# Patient Record
Sex: FEMALE | Race: WHITE | Employment: UNEMPLOYED | URBAN - METROPOLITAN AREA
[De-identification: names, ages, dates, MRNs, and addresses within clinical notes are randomized per-mention and may not be internally consistent; named-entity substitution may affect disease eponyms.]

---

## 2019-04-08 PROBLEM — G35 MULTIPLE SCLEROSIS (HCC): Status: ACTIVE | Noted: 2019-04-08

## 2019-04-09 ENCOUNTER — OFFICE VISIT (OUTPATIENT)
Dept: FAMILY MEDICINE CLINIC | Facility: CLINIC | Age: 44
End: 2019-04-09
Payer: COMMERCIAL

## 2019-04-09 ENCOUNTER — TELEPHONE (OUTPATIENT)
Dept: FAMILY MEDICINE CLINIC | Facility: CLINIC | Age: 44
End: 2019-04-09

## 2019-04-09 ENCOUNTER — LAB ENCOUNTER (OUTPATIENT)
Dept: LAB | Facility: REFERENCE LAB | Age: 44
End: 2019-04-09
Attending: FAMILY MEDICINE
Payer: COMMERCIAL

## 2019-04-09 VITALS
RESPIRATION RATE: 17 BRPM | HEART RATE: 90 BPM | DIASTOLIC BLOOD PRESSURE: 62 MMHG | WEIGHT: 156 LBS | OXYGEN SATURATION: 98 % | BODY MASS INDEX: 23.37 KG/M2 | SYSTOLIC BLOOD PRESSURE: 96 MMHG | HEIGHT: 68.5 IN

## 2019-04-09 DIAGNOSIS — M21.371 FOOT DROP, RIGHT FOOT: ICD-10-CM

## 2019-04-09 DIAGNOSIS — G35 MULTIPLE SCLEROSIS (HCC): ICD-10-CM

## 2019-04-09 DIAGNOSIS — G89.29 CHRONIC PAIN OF RIGHT KNEE: ICD-10-CM

## 2019-04-09 DIAGNOSIS — K59.09 OTHER CONSTIPATION: ICD-10-CM

## 2019-04-09 DIAGNOSIS — M25.561 CHRONIC PAIN OF RIGHT KNEE: ICD-10-CM

## 2019-04-09 DIAGNOSIS — M23.91 ACUTE INTERNAL DERANGEMENT OF RIGHT KNEE: ICD-10-CM

## 2019-04-09 DIAGNOSIS — G35 MULTIPLE SCLEROSIS (HCC): Primary | ICD-10-CM

## 2019-04-09 DIAGNOSIS — K21.9 GASTROESOPHAGEAL REFLUX DISEASE WITHOUT ESOPHAGITIS: ICD-10-CM

## 2019-04-09 DIAGNOSIS — R53.1 WEAKNESS OF RIGHT SIDE OF BODY: ICD-10-CM

## 2019-04-09 DIAGNOSIS — L30.9 DERMATITIS: ICD-10-CM

## 2019-04-09 DIAGNOSIS — Z00.00 ROUTINE MEDICAL EXAM: ICD-10-CM

## 2019-04-09 DIAGNOSIS — L71.9 ROSACEA: ICD-10-CM

## 2019-04-09 DIAGNOSIS — R32: ICD-10-CM

## 2019-04-09 PROCEDURE — 86431 RHEUMATOID FACTOR QUANT: CPT | Performed by: FAMILY MEDICINE

## 2019-04-09 PROCEDURE — 86038 ANTINUCLEAR ANTIBODIES: CPT | Performed by: FAMILY MEDICINE

## 2019-04-09 PROCEDURE — 80061 LIPID PANEL: CPT | Performed by: FAMILY MEDICINE

## 2019-04-09 PROCEDURE — 85652 RBC SED RATE AUTOMATED: CPT | Performed by: FAMILY MEDICINE

## 2019-04-09 PROCEDURE — 80050 GENERAL HEALTH PANEL: CPT | Performed by: FAMILY MEDICINE

## 2019-04-09 PROCEDURE — 36415 COLL VENOUS BLD VENIPUNCTURE: CPT | Performed by: FAMILY MEDICINE

## 2019-04-09 PROCEDURE — 99205 OFFICE O/P NEW HI 60 MIN: CPT | Performed by: FAMILY MEDICINE

## 2019-04-09 RX ORDER — ELECTROLYTES/DEXTROSE
1 SOLUTION, ORAL ORAL DAILY
Qty: 90 TABLET | Refills: 3 | Status: SHIPPED | OUTPATIENT
Start: 2019-04-09 | End: 2019-04-19

## 2019-04-09 RX ORDER — DALFAMPRIDINE 10 MG/1
1 TABLET, FILM COATED, EXTENDED RELEASE ORAL 2 TIMES DAILY
Qty: 180 TABLET | Refills: 0 | Status: SHIPPED | OUTPATIENT
Start: 2019-04-09 | End: 2019-07-08

## 2019-04-09 RX ORDER — RANITIDINE 150 MG/1
150 TABLET ORAL 2 TIMES DAILY
Qty: 180 TABLET | Refills: 0 | Status: SHIPPED | OUTPATIENT
Start: 2019-04-09 | End: 2019-10-01 | Stop reason: ALTCHOICE

## 2019-04-09 RX ORDER — MELOXICAM 7.5 MG/1
7.5 TABLET ORAL 2 TIMES DAILY WITH MEALS
Qty: 180 TABLET | Refills: 0 | Status: SHIPPED | OUTPATIENT
Start: 2019-04-09 | End: 2019-04-19 | Stop reason: ALTCHOICE

## 2019-04-09 RX ORDER — CLONAZEPAM 0.5 MG/1
0.5 TABLET ORAL DAILY
Refills: 2 | COMMUNITY
Start: 2019-02-10 | End: 2019-04-10

## 2019-04-09 RX ORDER — RANITIDINE 150 MG/1
150 TABLET ORAL DAILY
Refills: 0 | COMMUNITY
Start: 2019-02-05 | End: 2019-04-19

## 2019-04-09 RX ORDER — TOLTERODINE 4 MG/1
4 CAPSULE, EXTENDED RELEASE ORAL DAILY
Qty: 90 CAPSULE | Refills: 0 | Status: SHIPPED | OUTPATIENT
Start: 2019-04-09 | End: 2019-07-01

## 2019-04-09 NOTE — PROGRESS NOTES
HPI:   Patient presents with:  Establish Care  Multiple Sclerosis  Gastro-esophageal Reflux  Musculoskeletal Problem  Urinary  Constipation  Arthritis  Weakness  Derm Problem      Lazaro Allison is a 37year old female.     She primarily presents for: of food or animal allergies    GERD:  Heartburn, dyspepsia  Abd Pain present:epigastric  Belching present: yes  Medication(s): see below, ranitidine 150 mg once a day which is partially helpful  Compliant with GERD diet: yes  Denies other chest pain or rec Disp: 180 tablet Rfl: 0      Past Medical History:   Diagnosis Date   • Esophageal reflux    • [facility]) ([facility]          History reviewed. No pertinent surgical history.   No Known Allergies   Social History:  Social History    Tobacco Use auscultation B, no wheezing, no rhonchi or rales B   CARDIO: RRR without murmur, NL S1 S2, no S3 S4  EXT: no CCE; Brachial and PT/DP pulses wnl B  GI: NABS, soft, nondistended, no masses, no HSM, no tenderness, no guarding or rebound  NEURO: A&O x 3, right Dispense: 180 tablet; Refill: 0  - ASHLEE,DIRECT,REFLEX TITER + SPECIFIC ANTIBODIES; Future  - CBC WITH DIFFERENTIAL WITH PLATELET; Future  - COMP METABOLIC PANEL (14); Future  - RHEUMATOID ARTHRITIS FACTOR; Future  - SED LIGIA RAMACHANDRAN (AUTOMATED);  Future 4 mg once a day, follow-up in 2-3 months or sooner as needed  - Tolterodine Tartrate ER 4 MG Oral Capsule SR 24 Hr; Take 1 capsule (4 mg total) by mouth daily. Dispense: 90 capsule; Refill: 0    7.  Chronic pain of right knee, worsening  Discussed etiology MG Oral Tab 180 tablet 0     Sig: Take 1 tablet (150 mg total) by mouth 2 (two) times daily. • Meloxicam 7.5 MG Oral Tab 180 tablet 0     Sig: Take 1 tablet (7.5 mg total) by mouth 2 (two) times daily with meals.  And with ranitidine 150 mg   • Multiple V

## 2019-04-09 NOTE — TELEPHONE ENCOUNTER
Spoke to pharmacy, they state pt's insurance will not cover 7.5mg BID; asking if ok to change script to Meloxicam 15mg Take 1/2 tablet twice daily. Pended as suggested by pharmacy.

## 2019-04-10 DIAGNOSIS — G35 MS (MULTIPLE SCLEROSIS) (HCC): Primary | ICD-10-CM

## 2019-04-10 RX ORDER — CLONAZEPAM 0.5 MG/1
0.5 TABLET ORAL DAILY
Qty: 15 TABLET | Refills: 0 | Status: SHIPPED
Start: 2019-04-10 | End: 2019-05-28

## 2019-04-10 RX ORDER — MELOXICAM 15 MG/1
7.5 TABLET ORAL 2 TIMES DAILY
Qty: 90 TABLET | Refills: 0 | Status: SHIPPED | OUTPATIENT
Start: 2019-04-10 | End: 2019-05-28

## 2019-04-10 RX ORDER — CLONAZEPAM 0.5 MG/1
0.5 TABLET ORAL DAILY
Qty: 15 TABLET | Refills: 0 | Status: SHIPPED | OUTPATIENT
Start: 2019-04-10 | End: 2019-04-10

## 2019-04-10 NOTE — TELEPHONE ENCOUNTER
Called pt, advised #15 Clonazepam prescribed only, refills to be prescribed by Neurologist. Pt states she is unable to get into see Dr. Grisel Casas, referred pt to Dr. Javier Nino. Provided pt information to scheduled. Patient verbalizes understanding.     Advise

## 2019-04-10 NOTE — TELEPHONE ENCOUNTER
Advise pt that I will be happy to fill the clonazepam temporarily-she will need to see Neuro for further RFs as appropriate for txt of her MS, we did discuss this at her OV as well

## 2019-04-11 ENCOUNTER — MED REC SCAN ONLY (OUTPATIENT)
Dept: FAMILY MEDICINE CLINIC | Facility: CLINIC | Age: 44
End: 2019-04-11

## 2019-04-11 NOTE — TELEPHONE ENCOUNTER
Spoke to pharmacist at [facility]. No notes in their system stating there is an issue with the patient's medications.

## 2019-04-19 ENCOUNTER — HOSPITAL ENCOUNTER (OUTPATIENT)
Dept: GENERAL RADIOLOGY | Facility: HOSPITAL | Age: 44
Discharge: HOME | End: 2019-04-19
Attending: ORTHOPAEDIC SURGERY
Payer: COMMERCIAL

## 2019-04-19 DIAGNOSIS — Z47.89 ORTHOPEDIC AFTERCARE: ICD-10-CM

## 2019-05-28 ENCOUNTER — TELEPHONE (OUTPATIENT)
Dept: NEUROLOGY | Facility: CLINIC | Age: 44
End: 2019-05-28

## 2019-05-28 ENCOUNTER — OFFICE VISIT (OUTPATIENT)
Dept: NEUROLOGY | Facility: CLINIC | Age: 44
End: 2019-05-28
Payer: COMMERCIAL

## 2019-05-28 VITALS
DIASTOLIC BLOOD PRESSURE: 64 MMHG | HEIGHT: 69 IN | SYSTOLIC BLOOD PRESSURE: 110 MMHG | HEART RATE: 76 BPM | WEIGHT: 165 LBS | BODY MASS INDEX: 24.44 KG/M2 | RESPIRATION RATE: 16 BRPM

## 2019-05-28 DIAGNOSIS — G35 MS (MULTIPLE SCLEROSIS) (HCC): ICD-10-CM

## 2019-05-28 PROCEDURE — 99204 OFFICE O/P NEW MOD 45 MIN: CPT | Performed by: OTHER

## 2019-05-28 RX ORDER — PAROXETINE 10 MG/1
10 TABLET, FILM COATED ORAL EVERY MORNING
Qty: 90 TABLET | Refills: 3 | Status: SHIPPED | OUTPATIENT
Start: 2019-05-28 | End: 2019-05-28

## 2019-05-28 RX ORDER — DALFAMPRIDINE 10 MG/1
10 TABLET, FILM COATED, EXTENDED RELEASE ORAL 2 TIMES DAILY
Qty: 60 TABLET | Refills: 5 | Status: SHIPPED | OUTPATIENT
Start: 2019-05-28 | End: 2019-06-14

## 2019-05-28 RX ORDER — CLONAZEPAM 0.5 MG/1
0.5 TABLET ORAL DAILY
Qty: 15 TABLET | Refills: 0 | Status: SHIPPED | OUTPATIENT
Start: 2019-05-28 | End: 2019-05-29

## 2019-05-28 NOTE — PROGRESS NOTES
Neurology Initial Visit     Referred By: Dr. Vasquez ref. provider found    Chief Complaint: Patient presents with:  Multiple Sclerosis: New patient here for hx of MS for past 10 years.  Patient reports symptoms include right sided weakness/heavyness, swallowi excessive amount of tests ordered. She has been using Advil. She has been using clonazepam.  It seems that her insurance are now in the beginning of 2019 and she had to switch to a new insurance and that is why she ended up in our clinic at that time.   Levar Restrepo review was done and was negative      Physical Exam:   05/28/19  1048   BP: 110/64   Pulse: 76   Resp: 16   Weight: 165 lb   Height: 69\"       General: No apparent distress, well nourished, well groomed.   Head- Normocephalic, atraumatic  Eyes- No redness Value Date    HGB 13.4 04/09/2019    HCT 38.5 04/09/2019    MCV 94.8 04/09/2019    WBC 4.9 04/09/2019    .0 04/09/2019      Lab Results   Component Value Date    BUN 10 04/09/2019    CA 8.8 04/09/2019    ALT 26 04/09/2019    AST 15 04/09/2019    ALB

## 2019-05-28 NOTE — PROGRESS NOTES
Call placed to [facility] office( prior neuro md). All records requested to be faxed. Authorization of records signed by patient. Spoke with Elinor at [facility] office who stated she will put the request in and fax the information.

## 2019-05-28 NOTE — TELEPHONE ENCOUNTER
Pt called stated she was prescribed peroxetine 10, but needs peroxetine 20mg - pended accordingly.     Pt left two mailings, one to get out of jury duty and one to continue getting disability through life insurance - would like them to be completed as soon

## 2019-05-29 ENCOUNTER — TELEPHONE (OUTPATIENT)
Dept: NEUROLOGY | Facility: CLINIC | Age: 44
End: 2019-05-29

## 2019-05-29 ENCOUNTER — MED REC SCAN ONLY (OUTPATIENT)
Dept: NEUROLOGY | Facility: CLINIC | Age: 44
End: 2019-05-29

## 2019-05-29 ENCOUNTER — PATIENT MESSAGE (OUTPATIENT)
Dept: NEUROLOGY | Facility: CLINIC | Age: 44
End: 2019-05-29

## 2019-05-29 DIAGNOSIS — G35 MS (MULTIPLE SCLEROSIS) (HCC): ICD-10-CM

## 2019-05-29 RX ORDER — PAROXETINE HYDROCHLORIDE 20 MG/1
20 TABLET, FILM COATED ORAL EVERY MORNING
Qty: 90 TABLET | Refills: 3 | Status: SHIPPED | OUTPATIENT
Start: 2019-05-29 | End: 2020-05-14

## 2019-05-29 RX ORDER — CLONAZEPAM 0.5 MG/1
0.5 TABLET ORAL DAILY
Qty: 30 TABLET | Refills: 0 | COMMUNITY
Start: 2019-05-29 | End: 2019-06-25

## 2019-05-29 NOTE — TELEPHONE ENCOUNTER
From: Lazaro Allison  To: Ana Tapia MD  Sent: 5/29/2019 4:14 PM CDT  Subject: Non-Urgent Medical Question    Thank you i also gave a letter from [employer] to the

## 2019-05-29 NOTE — TELEPHONE ENCOUNTER
S/w Razia Ames ([id number]) who states MD did not sign one page from Tysabri form. Dr. Sean Young signed formed and re-faxed to [id number].

## 2019-05-29 NOTE — TELEPHONE ENCOUNTER
Pt requesting note to excuse her from jury duty. Letter drafted/signed. Copy sent to scanning. Original placed in envelope addressed to [facility] in [place] as provided by pt. Pt informed letter has been sent via [address] encounter 5/29/19.      ASHWIN hdz

## 2019-05-29 NOTE — TELEPHONE ENCOUNTER
From: Ector Valderrama  To: Fiona Carrion MD  Sent: 5/29/2019 2:30 PM CDT  Subject: Prescription Question    I need 1 tablet daily of clonazepam i am used to taking 2 tabs daily half a tablet will not work [facility] didn't get ibuprofen request

## 2019-05-29 NOTE — PROGRESS NOTES
Verbal approval from Dr. Alessandra Carlos to call in 1 tab daily of clonazepam 0.5mg daily. Prescription called into [facility] at [employer] in [facility].

## 2019-05-30 ENCOUNTER — TELEPHONE (OUTPATIENT)
Dept: NEUROLOGY | Facility: CLINIC | Age: 44
End: 2019-05-30

## 2019-06-03 NOTE — PROGRESS NOTES
Life insurance forms have been filled out and placed on Dr. Yunior Castro desk for [employer]. Once signed, patient to be informed. Copies of forms to be made/sent to scanning.  Originals to be mailed to [employer] using addressed/stamped en

## 2019-06-04 NOTE — PROGRESS NOTES
Jeffery Talavera forms have been signed and sent to [address] [place] [address] at [facility], [place], [facility] using address provided. Pt informed via [employer]. Copy of forms sent to scanning.

## 2019-06-06 NOTE — TELEPHONE ENCOUNTER
Jax Frias states that PA is required for Rx. PA goes through [email or web address] once form printed can be faxed to [phone] with clinical notes.

## 2019-06-08 ENCOUNTER — MED REC SCAN ONLY (OUTPATIENT)
Dept: NEUROLOGY | Facility: CLINIC | Age: 44
End: 2019-06-08

## 2019-06-14 ENCOUNTER — PATIENT MESSAGE (OUTPATIENT)
Dept: NEUROLOGY | Facility: CLINIC | Age: 44
End: 2019-06-14

## 2019-06-14 NOTE — TELEPHONE ENCOUNTER
From: Jaylin Miles  To: Joie Velasco MD  Sent: 6/14/2019 11:45 AM CDT  Subject: Prescription Question    Can u plz send ampyra not as generic?  They will cover name brand for me thank you

## 2019-06-14 NOTE — TELEPHONE ENCOUNTER
Tysabri was denied by insurance. Patient needs to try and fail gilenya before they will approve medication.

## 2019-06-17 RX ORDER — DALFAMPRIDINE 10 MG/1
10 TABLET, FILM COATED, EXTENDED RELEASE ORAL 2 TIMES DAILY
Qty: 60 TABLET | Refills: 5 | Status: SHIPPED | OUTPATIENT
Start: 2019-06-17 | End: 2019-07-09

## 2019-06-17 RX ORDER — DALFAMPRIDINE 10 MG/1
1 TABLET, FILM COATED, EXTENDED RELEASE ORAL 2 TIMES DAILY
Qty: 60 TABLET | Refills: 5 | Status: SHIPPED | OUTPATIENT
Start: 2019-06-17 | End: 2019-06-17

## 2019-06-17 RX ORDER — DALFAMPRIDINE 10 MG/1
1 TABLET, FILM COATED, EXTENDED RELEASE ORAL 2 TIMES DAILY
Qty: 60 TABLET | Refills: 5 | Status: SHIPPED | OUTPATIENT
Start: 2019-06-17 | End: 2019-07-09

## 2019-06-17 NOTE — TELEPHONE ENCOUNTER
Spoke to patient and notified her of below. She states that she is getting assistance from [employer] who is covering the drug. She states that they have been trying get to a hold of our office. Explained that I would call them to see what is needed.      I kevin

## 2019-06-17 NOTE — TELEPHONE ENCOUNTER
Antoni Kebede 5 minutes ago (1:49 PM)        Ellen Walker @ [employer] returning [employer]. States can leave detail message at number given.   [employer] stating an appeal needs to be done and if denied again Pt will be screened for a free drug program.         Appeal

## 2019-06-17 NOTE — TELEPHONE ENCOUNTER
Renee Paul @ [employer] [facility]. States can leave detail message at number given.   Renee Paul stating an appeal needs to be done and if denied again Pt will be screened for a free drug program.

## 2019-06-22 ENCOUNTER — PATIENT MESSAGE (OUTPATIENT)
Dept: NEUROLOGY | Facility: CLINIC | Age: 44
End: 2019-06-22

## 2019-06-22 DIAGNOSIS — G35 MULTIPLE SCLEROSIS (HCC): Primary | ICD-10-CM

## 2019-06-24 NOTE — TELEPHONE ENCOUNTER
From: Chapito Strong  To: Kat Brewer MD  Sent: 6/22/2019 2:59 PM CDT  Subject: Non-Urgent Medical Question    Any word on tysabri or ampyra? Can u [facility] script for sarahi virus plz? Nothing seems to be working out here. I feel awful.

## 2019-06-24 NOTE — PROGRESS NOTES
Replied to patient via [employer] informing her that Tysabri approval  Is pending, also the Asif Bravo was sent to her pharamacy. Also, BECCA virus lab order placed.

## 2019-06-25 DIAGNOSIS — G35 MS (MULTIPLE SCLEROSIS) (HCC): ICD-10-CM

## 2019-06-25 RX ORDER — CLONAZEPAM 0.5 MG/1
TABLET ORAL
Qty: 30 TABLET | Refills: 0 | OUTPATIENT
Start: 2019-06-28 | End: 2019-07-25

## 2019-06-25 NOTE — TELEPHONE ENCOUNTER
refill request for clonzepam 0.5 mg, take 1 tab daily, #30, no refills    LOV: 5/28/19  NOV: 8/23/19  Last refilled on 5/29/19

## 2019-06-30 ENCOUNTER — PATIENT MESSAGE (OUTPATIENT)
Dept: FAMILY MEDICINE CLINIC | Facility: CLINIC | Age: 44
End: 2019-06-30

## 2019-06-30 DIAGNOSIS — R32: ICD-10-CM

## 2019-07-01 ENCOUNTER — TELEPHONE (OUTPATIENT)
Dept: HEMATOLOGY/ONCOLOGY | Facility: HOSPITAL | Age: 44
End: 2019-07-01

## 2019-07-01 ENCOUNTER — TELEPHONE (OUTPATIENT)
Dept: NEUROLOGY | Facility: CLINIC | Age: 44
End: 2019-07-01

## 2019-07-01 RX ORDER — TOLTERODINE 4 MG/1
4 CAPSULE, EXTENDED RELEASE ORAL DAILY
Qty: 90 CAPSULE | Refills: 0 | Status: SHIPPED | OUTPATIENT
Start: 2019-07-01 | End: 2019-09-23

## 2019-07-01 NOTE — TELEPHONE ENCOUNTER
From: Linda Ventura  To: Lori Lemons MD  Sent: 6/30/2019 8:59 AM CDT  Subject: Prescription Question    Hi i need refill on tolterodine bladder pill I have an appointment next tuesday thank you

## 2019-07-01 NOTE — TELEPHONE ENCOUNTER
Spoke with Olivia at Dr. Alyssa Cowden office. Pt had BECCA virus previously done at another facility and is OK to start Tysabri tomorrow.

## 2019-07-01 NOTE — TELEPHONE ENCOUNTER
Received a call from [facility]. Patient will be getting tysabri infusion tomorrow. BECCA Virus was ordered and patient will be [facility] home. Last stratify JCV index done on 2/18/19 was negative with index of 0.14.

## 2019-07-01 NOTE — TELEPHONE ENCOUNTER
A refill request was received for:  Requested Prescriptions     Pending Prescriptions Disp Refills   • Tolterodine Tartrate ER 4 MG Oral Capsule SR 24 Hr 90 capsule 0     Sig: Take 1 capsule (4 mg total) by mouth daily.      Last refill date:  4/9/2019  Jillian Aparicio

## 2019-07-02 ENCOUNTER — OFFICE VISIT (OUTPATIENT)
Dept: HEMATOLOGY/ONCOLOGY | Facility: HOSPITAL | Age: 44
End: 2019-07-02
Attending: Other
Payer: COMMERCIAL

## 2019-07-02 VITALS
HEART RATE: 105 BPM | RESPIRATION RATE: 16 BRPM | TEMPERATURE: 99 F | BODY MASS INDEX: 24 KG/M2 | WEIGHT: 159.38 LBS | DIASTOLIC BLOOD PRESSURE: 75 MMHG | OXYGEN SATURATION: 98 % | SYSTOLIC BLOOD PRESSURE: 121 MMHG

## 2019-07-02 DIAGNOSIS — G35 MULTIPLE SCLEROSIS (HCC): Primary | ICD-10-CM

## 2019-07-02 PROCEDURE — 96365 THER/PROPH/DIAG IV INF INIT: CPT

## 2019-07-02 NOTE — PROGRESS NOTES
Patient arrived for monthly Tysabri. Touch program questionnaire completed. Patient denies any new or worsening medical problems over the past month.  Denies any changes in thinking, eyesight, balance, strength or any other problems that have persisted ov mobility will be better.

## 2019-07-09 ENCOUNTER — APPOINTMENT (OUTPATIENT)
Dept: LAB | Age: 44
End: 2019-07-09
Attending: FAMILY MEDICINE
Payer: COMMERCIAL

## 2019-07-09 ENCOUNTER — OFFICE VISIT (OUTPATIENT)
Dept: FAMILY MEDICINE CLINIC | Facility: CLINIC | Age: 44
End: 2019-07-09
Payer: COMMERCIAL

## 2019-07-09 VITALS
WEIGHT: 160 LBS | SYSTOLIC BLOOD PRESSURE: 100 MMHG | BODY MASS INDEX: 23.7 KG/M2 | HEIGHT: 69 IN | HEART RATE: 101 BPM | DIASTOLIC BLOOD PRESSURE: 78 MMHG

## 2019-07-09 DIAGNOSIS — E55.9 VITAMIN D DEFICIENCY: ICD-10-CM

## 2019-07-09 DIAGNOSIS — R21 RASH AND NONSPECIFIC SKIN ERUPTION: ICD-10-CM

## 2019-07-09 DIAGNOSIS — Z79.899 LONG-TERM USE OF HIGH-RISK MEDICATION: ICD-10-CM

## 2019-07-09 DIAGNOSIS — N32.81 OVERACTIVE BLADDER: ICD-10-CM

## 2019-07-09 DIAGNOSIS — B37.3 YEAST VAGINITIS: ICD-10-CM

## 2019-07-09 DIAGNOSIS — G35 MULTIPLE SCLEROSIS (HCC): ICD-10-CM

## 2019-07-09 DIAGNOSIS — M21.961 ACQUIRED DEFORMITY OF RIGHT ANKLE AND FOOT: ICD-10-CM

## 2019-07-09 DIAGNOSIS — M62.471 CONTRACTURE OF MUSCLE OF RIGHT FOOT: Primary | ICD-10-CM

## 2019-07-09 DIAGNOSIS — R73.9 HYPERGLYCEMIA: ICD-10-CM

## 2019-07-09 DIAGNOSIS — M25.50 ARTHRALGIA, UNSPECIFIED JOINT: ICD-10-CM

## 2019-07-09 DIAGNOSIS — L71.9 ROSACEA: ICD-10-CM

## 2019-07-09 PROBLEM — B35.1 ONYCHOMYCOSIS: Status: ACTIVE | Noted: 2019-07-09

## 2019-07-09 PROBLEM — N94.6 DYSMENORRHEA: Status: ACTIVE | Noted: 2019-07-09

## 2019-07-09 LAB
ALBUMIN SERPL-MCNC: 4.1 G/DL (ref 3.4–5)
ALBUMIN/GLOB SERPL: 1.2 {RATIO} (ref 1–2)
ALP LIVER SERPL-CCNC: 83 U/L (ref 37–98)
ALT SERPL-CCNC: 24 U/L (ref 13–56)
ANION GAP SERPL CALC-SCNC: 8 MMOL/L (ref 0–18)
AST SERPL-CCNC: 15 U/L (ref 15–37)
BILIRUB SERPL-MCNC: 0.5 MG/DL (ref 0.1–2)
BUN BLD-MCNC: 9 MG/DL (ref 7–18)
BUN/CREAT SERPL: 11.3 (ref 10–20)
CALCIUM BLD-MCNC: 9 MG/DL (ref 8.5–10.1)
CHLORIDE SERPL-SCNC: 107 MMOL/L (ref 98–112)
CO2 SERPL-SCNC: 26 MMOL/L (ref 21–32)
CREAT BLD-MCNC: 0.8 MG/DL (ref 0.55–1.02)
EST. AVERAGE GLUCOSE BLD GHB EST-MCNC: 85 MG/DL (ref 68–126)
GLOBULIN PLAS-MCNC: 3.4 G/DL (ref 2.8–4.4)
GLUCOSE BLD-MCNC: 92 MG/DL (ref 70–99)
HBA1C MFR BLD HPLC: 4.6 % (ref ?–5.7)
M PROTEIN MFR SERPL ELPH: 7.5 G/DL (ref 6.4–8.2)
OSMOLALITY SERPL CALC.SUM OF ELEC: 290 MOSM/KG (ref 275–295)
PATIENT FASTING: NO
POTASSIUM SERPL-SCNC: 4 MMOL/L (ref 3.5–5.1)
SODIUM SERPL-SCNC: 141 MMOL/L (ref 136–145)

## 2019-07-09 PROCEDURE — 36415 COLL VENOUS BLD VENIPUNCTURE: CPT

## 2019-07-09 PROCEDURE — 99214 OFFICE O/P EST MOD 30 MIN: CPT | Performed by: FAMILY MEDICINE

## 2019-07-09 PROCEDURE — 80053 COMPREHEN METABOLIC PANEL: CPT | Performed by: FAMILY MEDICINE

## 2019-07-09 PROCEDURE — 83036 HEMOGLOBIN GLYCOSYLATED A1C: CPT | Performed by: FAMILY MEDICINE

## 2019-07-09 PROCEDURE — 83036 HEMOGLOBIN GLYCOSYLATED A1C: CPT

## 2019-07-09 PROCEDURE — 82306 VITAMIN D 25 HYDROXY: CPT | Performed by: FAMILY MEDICINE

## 2019-07-09 RX ORDER — DICLOFENAC SODIUM 75 MG/1
75 TABLET, DELAYED RELEASE ORAL 2 TIMES DAILY
Qty: 60 TABLET | Refills: 2 | Status: SHIPPED | OUTPATIENT
Start: 2019-07-09 | End: 2019-10-03

## 2019-07-09 RX ORDER — METRONIDAZOLE 10 MG/G
1 GEL TOPICAL 2 TIMES DAILY
Qty: 60 G | Refills: 1 | Status: SHIPPED | OUTPATIENT
Start: 2019-07-09 | End: 2019-08-20 | Stop reason: ALTCHOICE

## 2019-07-09 RX ORDER — FLUCONAZOLE 150 MG/1
150 TABLET ORAL ONCE
Qty: 2 TABLET | Refills: 2 | Status: SHIPPED | OUTPATIENT
Start: 2019-07-09 | End: 2019-07-09

## 2019-07-09 NOTE — PROGRESS NOTES
HPI:   Patient presents with:  Multiple Sclerosis: R toe, unable to straighten  Lab: Vitamin D check, check kidneys for increased aspirin intake (3 x qd)  Derm Problem: upper bilat legs, patient feels they are flea bites.  Also redness on face  Yeast Infect 0.5 MG Oral Tab TAKE 1 TABLET BY MOUTH EVERY DAY Disp: 30 tablet Rfl: 0   natalizumab 300 MG/15ML Intravenous Conc Inject 15 mL (300 mg total) into the vein every 30 (thirty) days.  Dx: Multiple Sclerosis (G35) Disp: 15 mL Rfl: 3   PARoxetine HCl 20 MG Oral encounter: 160 lb.      GENERAL: well nourished, female  in no apparent distress  SKIN: skin color wnl, B L lower legs: few macular papular erythematous lesions, scattered  HEENT: atraumatic, normocephalic, nose clear; throat clear; dentition good   EYES: P (two) times daily. PRN, take w food  Dispense: 60 tablet; Refill: 2    3. 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  PRN, take w food  Dis Placed This Encounter      CMP      HgbA1c (Glycohemoglobin)      Meds & Refills for this Visit:  Requested Prescriptions     Signed Prescriptions Disp Refills   • Diclofenac Sodium 75 MG Oral Tab EC 60 tablet 2     Sig: Take 1 tablet (75 mg total) by catherine

## 2019-07-11 ENCOUNTER — TELEPHONE (OUTPATIENT)
Dept: FAMILY MEDICINE CLINIC | Facility: CLINIC | Age: 44
End: 2019-07-11

## 2019-07-11 DIAGNOSIS — G35 MULTIPLE SCLEROSIS (HCC): Primary | ICD-10-CM

## 2019-07-11 DIAGNOSIS — E55.9 VITAMIN D DEFICIENCY: ICD-10-CM

## 2019-07-11 NOTE — TELEPHONE ENCOUNTER
Printed last office note, demographics, med list, and referral to [place]. Placed at  for faxing.

## 2019-07-11 NOTE — TELEPHONE ENCOUNTER
[address] received referral from our office > in addition they need home health order signed by physician,the  latest office visit notes plus med list and  pt demopravernoncs.  fax [phone]

## 2019-07-24 ENCOUNTER — PATIENT MESSAGE (OUTPATIENT)
Dept: NEUROLOGY | Facility: CLINIC | Age: 44
End: 2019-07-24

## 2019-07-24 DIAGNOSIS — G35 MS (MULTIPLE SCLEROSIS) (HCC): ICD-10-CM

## 2019-07-25 DIAGNOSIS — G35 MS (MULTIPLE SCLEROSIS) (HCC): ICD-10-CM

## 2019-07-25 RX ORDER — CLONAZEPAM 0.5 MG/1
TABLET ORAL
Qty: 30 TABLET | Refills: 0 | OUTPATIENT
Start: 2019-07-25

## 2019-07-25 RX ORDER — CLONAZEPAM 0.5 MG/1
0.5 TABLET ORAL
Qty: 30 TABLET | Refills: 0 | Status: SHIPPED | OUTPATIENT
Start: 2019-07-25 | End: 2019-08-23

## 2019-07-25 NOTE — PROGRESS NOTES
Refill request for clonazepam 0.5 mg, take 1 tab daily, #30, no refills    LOV: 5/28/19  NOV: 8/28/19  [facility]

## 2019-07-25 NOTE — TELEPHONE ENCOUNTER
From: Kassi Krueger  To: Jomar Nielson MD  Sent: 7/24/2019 5:57 PM CDT  Subject: Prescription Question    Hi i need 30 day clonazepam not 15 please.

## 2019-07-30 ENCOUNTER — OFFICE VISIT (OUTPATIENT)
Dept: HEMATOLOGY/ONCOLOGY | Facility: HOSPITAL | Age: 44
End: 2019-07-30
Attending: Other
Payer: COMMERCIAL

## 2019-07-30 VITALS
HEART RATE: 88 BPM | RESPIRATION RATE: 16 BRPM | TEMPERATURE: 98 F | BODY MASS INDEX: 24 KG/M2 | WEIGHT: 159.63 LBS | SYSTOLIC BLOOD PRESSURE: 111 MMHG | OXYGEN SATURATION: 100 % | DIASTOLIC BLOOD PRESSURE: 68 MMHG

## 2019-07-30 DIAGNOSIS — G35 MULTIPLE SCLEROSIS (HCC): Primary | ICD-10-CM

## 2019-07-30 PROCEDURE — 96365 THER/PROPH/DIAG IV INF INIT: CPT

## 2019-07-30 NOTE — PROGRESS NOTES
Pt to infusion area for Tysabri. Ambulating with use of cane and brace on right knee. She states she does not have much movement in her right leg. She is happy with the Tysabri and states she has been able to move around much better since restarting it.  Ty

## 2019-08-16 ENCOUNTER — TELEPHONE (OUTPATIENT)
Dept: FAMILY MEDICINE CLINIC | Facility: CLINIC | Age: 44
End: 2019-08-16

## 2019-08-16 NOTE — TELEPHONE ENCOUNTER
Rcvd D/C orders from [facility]. Report placed on provider's desk for review and signature. Return fax to [phone].

## 2019-08-20 ENCOUNTER — OFFICE VISIT (OUTPATIENT)
Dept: FAMILY MEDICINE CLINIC | Facility: CLINIC | Age: 44
End: 2019-08-20
Payer: COMMERCIAL

## 2019-08-20 VITALS
WEIGHT: 161 LBS | HEIGHT: 68.2 IN | BODY MASS INDEX: 24.4 KG/M2 | DIASTOLIC BLOOD PRESSURE: 78 MMHG | RESPIRATION RATE: 17 BRPM | HEART RATE: 73 BPM | SYSTOLIC BLOOD PRESSURE: 110 MMHG

## 2019-08-20 DIAGNOSIS — Z12.31 SCREENING MAMMOGRAM, ENCOUNTER FOR: ICD-10-CM

## 2019-08-20 DIAGNOSIS — Z01.419 ENCOUNTER FOR WELL WOMAN EXAM WITH ROUTINE GYNECOLOGICAL EXAM: ICD-10-CM

## 2019-08-20 DIAGNOSIS — Z00.00 ROUTINE MEDICAL EXAM: Primary | ICD-10-CM

## 2019-08-20 DIAGNOSIS — Z12.4 SCREENING FOR MALIGNANT NEOPLASM OF CERVIX: ICD-10-CM

## 2019-08-20 PROCEDURE — 99396 PREV VISIT EST AGE 40-64: CPT | Performed by: FAMILY MEDICINE

## 2019-08-20 PROCEDURE — 87624 HPV HI-RISK TYP POOLED RSLT: CPT | Performed by: FAMILY MEDICINE

## 2019-08-20 RX ORDER — DALFAMPRIDINE 10 MG/1
TABLET, FILM COATED, EXTENDED RELEASE ORAL 2 TIMES DAILY
COMMUNITY
Start: 2019-08-19 | End: 2020-01-21

## 2019-08-20 NOTE — H&P
HPI:   Patient presents with:  Physical  Gyn Exam      Maia Hammonds is a 40year old female who presents for a complete physical and pap/gyne exam.     Patient has new complaints of:  · None    She denies CP, freq HAs,SOB, Dizziness, Palpitations, Weight anxiety disorder     Dysthymia     Dyspnea     Dysmenorrhea     Acquired scoliosis     Acquired deformity of ankle and foot     Acquired claw toe    Past Medical History:   Diagnosis Date   • Esophageal reflux    • MS (multiple sclerosis) ([facility]       Histo new mood concerns    EXAM:   /78 (BP Location: Right arm)   Pulse 73   Resp 17   Ht 68.2\"   Wt 161 lb   LMP 08/06/2019   BMI 24.34 kg/m² , Estimated body mass index is 24.34 kg/m² as calculated from the following:    Height as of this encounter: 68. 24.34 kg/m² as calculated from the following:    Height as of this encounter: 68.2\". Weight as of this encounter: 161 lb. .   Discussed importance of exercise and healthy well balanced diet.  Recommend low fat healthy DASH diet and aerobic exercise 30 mi

## 2019-08-21 LAB — HPV I/H RISK 1 DNA SPEC QL NAA+PROBE: NEGATIVE

## 2019-08-23 DIAGNOSIS — G35 MS (MULTIPLE SCLEROSIS) (HCC): ICD-10-CM

## 2019-08-23 NOTE — TELEPHONE ENCOUNTER
Refill request for clonazepam 0.5 mg, take 1 tab daily, #30, no refills    LOV: 5/28/19  NOV: 8/28/19  [place]

## 2019-08-26 ENCOUNTER — TELEPHONE (OUTPATIENT)
Dept: NEUROLOGY | Facility: CLINIC | Age: 44
End: 2019-08-26

## 2019-08-26 ENCOUNTER — PATIENT MESSAGE (OUTPATIENT)
Dept: NEUROLOGY | Facility: CLINIC | Age: 44
End: 2019-08-26

## 2019-08-26 DIAGNOSIS — G35 MS (MULTIPLE SCLEROSIS) (HCC): ICD-10-CM

## 2019-08-26 RX ORDER — CLONAZEPAM 0.5 MG/1
0.5 TABLET ORAL
Qty: 30 TABLET | Refills: 0 | Status: CANCELLED | OUTPATIENT
Start: 2019-08-26

## 2019-08-26 RX ORDER — CLONAZEPAM 0.5 MG/1
TABLET ORAL
Qty: 30 TABLET | Refills: 0 | Status: SHIPPED | OUTPATIENT
Start: 2019-08-26 | End: 2019-09-24

## 2019-08-26 NOTE — TELEPHONE ENCOUNTER
Infusion center calling to inform  that patient has cancelled her infusions via [employer] stating they are too expensive.

## 2019-08-27 ENCOUNTER — APPOINTMENT (OUTPATIENT)
Dept: HEMATOLOGY/ONCOLOGY | Facility: HOSPITAL | Age: 44
End: 2019-08-27
Attending: Other
Payer: COMMERCIAL

## 2019-08-27 NOTE — TELEPHONE ENCOUNTER
Can you reach out to her and find out if she wants a different disease modifying therapy since that is what would be recommended.

## 2019-08-27 NOTE — TELEPHONE ENCOUNTER
Called patient. Patient states that she is receiving Tysabri free of charge through the patient assistance program.    She has been getting her infusions but the hospital billed her $321.00.  She has been working with billing to resolve the issue but unt

## 2019-09-10 RX ORDER — FLUCONAZOLE 150 MG/1
TABLET ORAL
Qty: 2 TABLET | Refills: 0 | OUTPATIENT
Start: 2019-09-10

## 2019-09-10 RX ORDER — FLUCONAZOLE 150 MG/1
TABLET ORAL
Qty: 2 TABLET | Refills: 0 | Status: SHIPPED | OUTPATIENT
Start: 2019-09-10 | End: 2019-10-10

## 2019-09-10 NOTE — TELEPHONE ENCOUNTER
A refill request was received for:  Requested Prescriptions     Pending Prescriptions Disp Refills   • FLUCONAZOLE 150 MG Oral Tab [Pharmacy Med Name: FLUCONAZOLE 150MG TABLETS] 2 tablet 0     Sig: TAKE 1 TABLET(150 MG) BY MOUTH 1 TIME FOR 1 DOSE. MAY REPE

## 2019-09-11 ENCOUNTER — OFFICE VISIT (OUTPATIENT)
Dept: HEMATOLOGY/ONCOLOGY | Facility: HOSPITAL | Age: 44
End: 2019-09-11
Attending: Other
Payer: COMMERCIAL

## 2019-09-11 VITALS
HEART RATE: 97 BPM | RESPIRATION RATE: 16 BRPM | OXYGEN SATURATION: 100 % | TEMPERATURE: 98 F | DIASTOLIC BLOOD PRESSURE: 73 MMHG | SYSTOLIC BLOOD PRESSURE: 118 MMHG

## 2019-09-11 DIAGNOSIS — G35 MULTIPLE SCLEROSIS (HCC): Primary | ICD-10-CM

## 2019-09-11 PROCEDURE — 96365 THER/PROPH/DIAG IV INF INIT: CPT

## 2019-09-11 NOTE — PROGRESS NOTES
Pt to infusion area for Tysabri. Ambulating with use of cane and brace on right knee. She states she does not have much movement in her right leg. She is happy with the Tysabri and states she has been able to move around much better since restarting it.  On

## 2019-09-23 DIAGNOSIS — R32: ICD-10-CM

## 2019-09-24 DIAGNOSIS — G35 MS (MULTIPLE SCLEROSIS) (HCC): ICD-10-CM

## 2019-09-24 RX ORDER — TOLTERODINE 4 MG/1
CAPSULE, EXTENDED RELEASE ORAL
Qty: 90 CAPSULE | Refills: 0 | Status: SHIPPED | OUTPATIENT
Start: 2019-09-24 | End: 2019-12-20

## 2019-09-24 NOTE — TELEPHONE ENCOUNTER
A refill request was received for:  Requested Prescriptions     Pending Prescriptions Disp Refills   • TOLTERODINE TARTRATE ER 4 MG Oral Capsule SR 24 Hr [Pharmacy Med Name: TOLTERODINE TART ER 4MG CAPSULES] 90 capsule 0     Sig: TAKE 1 CAPSULE(4 MG) BY MO

## 2019-09-25 DIAGNOSIS — G35 MS (MULTIPLE SCLEROSIS) (HCC): ICD-10-CM

## 2019-09-25 RX ORDER — CLONAZEPAM 0.5 MG/1
0.5 TABLET ORAL
Qty: 30 TABLET | Refills: 0 | Status: SHIPPED | OUTPATIENT
Start: 2019-09-25 | End: 2019-10-23

## 2019-09-25 RX ORDER — CLONAZEPAM 0.5 MG/1
TABLET ORAL
Qty: 30 TABLET | Refills: 0 | OUTPATIENT
Start: 2019-09-25

## 2019-10-01 ENCOUNTER — TELEPHONE (OUTPATIENT)
Dept: FAMILY MEDICINE CLINIC | Facility: CLINIC | Age: 44
End: 2019-10-01

## 2019-10-01 NOTE — TELEPHONE ENCOUNTER
[facility] home health orders for pt from [facility]. Requests signature and date; return fax to [phone].

## 2019-10-03 DIAGNOSIS — M21.961 ACQUIRED DEFORMITY OF RIGHT ANKLE AND FOOT: ICD-10-CM

## 2019-10-03 DIAGNOSIS — M25.50 ARTHRALGIA, UNSPECIFIED JOINT: ICD-10-CM

## 2019-10-03 DIAGNOSIS — M62.471 CONTRACTURE OF MUSCLE OF RIGHT FOOT: ICD-10-CM

## 2019-10-03 DIAGNOSIS — G35 MULTIPLE SCLEROSIS (HCC): ICD-10-CM

## 2019-10-04 DIAGNOSIS — M21.961 ACQUIRED DEFORMITY OF RIGHT ANKLE AND FOOT: ICD-10-CM

## 2019-10-04 DIAGNOSIS — M25.50 ARTHRALGIA, UNSPECIFIED JOINT: ICD-10-CM

## 2019-10-04 DIAGNOSIS — G35 MULTIPLE SCLEROSIS (HCC): ICD-10-CM

## 2019-10-04 DIAGNOSIS — M62.471 CONTRACTURE OF MUSCLE OF RIGHT FOOT: ICD-10-CM

## 2019-10-04 RX ORDER — DICLOFENAC SODIUM 75 MG/1
75 TABLET, DELAYED RELEASE ORAL 2 TIMES DAILY
Qty: 60 TABLET | Refills: 2 | OUTPATIENT
Start: 2019-10-04

## 2019-10-04 RX ORDER — DICLOFENAC SODIUM 75 MG/1
TABLET, DELAYED RELEASE ORAL
Qty: 180 TABLET | Refills: 0 | Status: SHIPPED | OUTPATIENT
Start: 2019-10-04 | End: 2019-12-04

## 2019-10-04 NOTE — TELEPHONE ENCOUNTER
A refill request was received for:  Requested Prescriptions     Pending Prescriptions Disp Refills   • DICLOFENAC SODIUM 75 MG Oral Tab EC [Pharmacy Med Name: DICLOFENAC SODIUM 75MG DR TABLETS] 60 tablet 0     Sig: TAKE 1 TABLET(75 MG) BY MOUTH TWICE DAILY

## 2019-10-05 DIAGNOSIS — R32: ICD-10-CM

## 2019-10-07 RX ORDER — TOLTERODINE 4 MG/1
CAPSULE, EXTENDED RELEASE ORAL
Qty: 90 CAPSULE | Refills: 0 | OUTPATIENT
Start: 2019-10-07

## 2019-10-07 NOTE — TELEPHONE ENCOUNTER
rx refilled to [facility] on 9/24/2019 #90 by Dr. Mandi Eugene. Refill too soon - refused.

## 2019-10-09 ENCOUNTER — OFFICE VISIT (OUTPATIENT)
Dept: HEMATOLOGY/ONCOLOGY | Facility: HOSPITAL | Age: 44
End: 2019-10-09
Attending: Other
Payer: COMMERCIAL

## 2019-10-09 VITALS
TEMPERATURE: 99 F | RESPIRATION RATE: 16 BRPM | HEART RATE: 101 BPM | SYSTOLIC BLOOD PRESSURE: 115 MMHG | DIASTOLIC BLOOD PRESSURE: 77 MMHG | OXYGEN SATURATION: 100 %

## 2019-10-09 DIAGNOSIS — G35 MULTIPLE SCLEROSIS (HCC): Primary | ICD-10-CM

## 2019-10-09 PROCEDURE — 96365 THER/PROPH/DIAG IV INF INIT: CPT

## 2019-10-09 NOTE — PROGRESS NOTES
Pt to infusion for monthly Tysabri for MS. Arrived ambulating with use of cane. Brace present right leg. . Denies any recent changes in her condition. States she had accidentally bent her toe about a week ago getting out of bed.  States she could not feel pa

## 2019-10-10 ENCOUNTER — OFFICE VISIT (OUTPATIENT)
Dept: NEUROLOGY | Facility: CLINIC | Age: 44
End: 2019-10-10
Payer: COMMERCIAL

## 2019-10-10 VITALS — WEIGHT: 160 LBS | BODY MASS INDEX: 24.25 KG/M2 | HEIGHT: 68 IN

## 2019-10-10 DIAGNOSIS — S99.921A INJURY OF TOE ON RIGHT FOOT, INITIAL ENCOUNTER: ICD-10-CM

## 2019-10-10 DIAGNOSIS — G35 MS (MULTIPLE SCLEROSIS) (HCC): Primary | ICD-10-CM

## 2019-10-10 PROCEDURE — 99214 OFFICE O/P EST MOD 30 MIN: CPT | Performed by: OTHER

## 2019-10-10 RX ORDER — ZOLPIDEM TARTRATE 5 MG/1
5 TABLET ORAL NIGHTLY PRN
Qty: 90 TABLET | Refills: 3 | Status: SHIPPED | OUTPATIENT
Start: 2019-10-10 | End: 2020-01-21

## 2019-10-10 RX ORDER — CALCIUM POLYCARBOPHIL 625 MG
TABLET ORAL
COMMUNITY
End: 2019-12-04

## 2019-10-10 NOTE — PROGRESS NOTES
Neurology follow-up visit     Referred By: Dr. Vasquez ref. provider found    Chief Complaint: Patient presents with:  Multiple Sclerosis: LOV: 5/28/19. F/U on MS. Patient states that she is declining. She states that she is fatigued but not able to sleep.  Sh beginning of 2019 and she had to switch to a new insurance and that is why she ended up in our clinic at that time. She was expressing desire to go back on Tysabri. She reports that her BECCA virus was done recently and that was again negative.     Patient w MG Oral Tab, Take 1 tablet (0.5 mg total) by mouth once daily. , Disp: 30 tablet, Rfl: 0  •  TOLTERODINE TARTRATE ER 4 MG Oral Capsule SR 24 Hr, TAKE 1 CAPSULE(4 MG) BY MOUTH DAILY, Disp: 90 capsule, Rfl: 0  •  AMPYRA 10 MG Oral Tablet 12 Hr, 2 (two) times fasciculations  Strength- upper extremities 5-/5 proximally and distally on the left, and 4- out of 5 in the right                  - lower  extremities 5-/5 proximally and distally on the left and 3 out of 5 on the right    Sensory Exam:  Light touch sens her quality of life is suffering dramatically       Education and counseling provided to patient. Instructed patient to call my office or seek medical attention immediately if symptoms worsen. Patient verbalized understanding of information given.  All que

## 2019-10-18 ENCOUNTER — NURSE ONLY (OUTPATIENT)
Dept: FAMILY MEDICINE CLINIC | Facility: CLINIC | Age: 44
End: 2019-10-18
Payer: COMMERCIAL

## 2019-10-18 DIAGNOSIS — Z23 NEEDS FLU SHOT: Primary | ICD-10-CM

## 2019-10-18 PROCEDURE — 90471 IMMUNIZATION ADMIN: CPT | Performed by: FAMILY MEDICINE

## 2019-10-18 PROCEDURE — 90686 IIV4 VACC NO PRSV 0.5 ML IM: CPT | Performed by: FAMILY MEDICINE

## 2019-10-18 NOTE — PROGRESS NOTES
Patient comes to office for Flu vaccine  Vaccine consent form signed. Vaccine inj'd to patient's L deltoid  Patient tolerated well with no complications. Patient advised to go to ER with any adverse reactions.   Patient verbalizes understanding of recomme

## 2019-10-23 DIAGNOSIS — G35 MS (MULTIPLE SCLEROSIS) (HCC): ICD-10-CM

## 2019-10-23 RX ORDER — CLONAZEPAM 0.5 MG/1
TABLET ORAL
Qty: 90 TABLET | Refills: 1 | Status: SHIPPED | OUTPATIENT
Start: 2019-10-23 | End: 2020-04-20

## 2019-10-23 NOTE — TELEPHONE ENCOUNTER
Clonazepam 0.5mg. Take 1 tablet daily.  #90. 1 refills    [place]    LOV-10/10/2019  NOV-none    Pended for 6 month supply(as she request monthly)    Sign if appropriate or change back to monthly  thanks

## 2019-11-06 ENCOUNTER — OFFICE VISIT (OUTPATIENT)
Dept: HEMATOLOGY/ONCOLOGY | Facility: HOSPITAL | Age: 44
End: 2019-11-06
Attending: Other
Payer: COMMERCIAL

## 2019-11-06 VITALS
DIASTOLIC BLOOD PRESSURE: 70 MMHG | BODY MASS INDEX: 25 KG/M2 | OXYGEN SATURATION: 100 % | RESPIRATION RATE: 18 BRPM | SYSTOLIC BLOOD PRESSURE: 113 MMHG | TEMPERATURE: 99 F | HEART RATE: 91 BPM | WEIGHT: 167.19 LBS

## 2019-11-06 DIAGNOSIS — G35 MULTIPLE SCLEROSIS (HCC): Primary | ICD-10-CM

## 2019-11-06 PROCEDURE — 96365 THER/PROPH/DIAG IV INF INIT: CPT

## 2019-11-06 NOTE — PROGRESS NOTES
Pt to infusion for monthly Tysabri for MS. Arrived ambulating with use of cane. Brace present right leg. . Denies any recent changes in her condition. Continues to c/o fatigue. .  Infusion given over 1 hour , pt only wanted to stay for 30 minute observati

## 2019-11-21 ENCOUNTER — TELEPHONE (OUTPATIENT)
Dept: NEUROLOGY | Facility: CLINIC | Age: 44
End: 2019-11-21

## 2019-11-21 NOTE — TELEPHONE ENCOUNTER
Received in mail request for information form adverse drug event following treatment with Ampyra. Per Epic review no reported adverse effect at office visit 5/25/19 and 10/10/19.  No reported adverse effect during multiple phone encounters during same dixie

## 2019-12-02 ENCOUNTER — TELEPHONE (OUTPATIENT)
Dept: NEUROLOGY | Facility: CLINIC | Age: 44
End: 2019-12-02

## 2019-12-03 ENCOUNTER — MED REC SCAN ONLY (OUTPATIENT)
Dept: NEUROLOGY | Facility: CLINIC | Age: 44
End: 2019-12-03

## 2019-12-04 ENCOUNTER — OFFICE VISIT (OUTPATIENT)
Dept: HEMATOLOGY/ONCOLOGY | Facility: HOSPITAL | Age: 44
End: 2019-12-04
Attending: Other
Payer: COMMERCIAL

## 2019-12-04 VITALS
DIASTOLIC BLOOD PRESSURE: 67 MMHG | SYSTOLIC BLOOD PRESSURE: 119 MMHG | TEMPERATURE: 99 F | HEART RATE: 85 BPM | RESPIRATION RATE: 16 BRPM

## 2019-12-04 DIAGNOSIS — G35 MULTIPLE SCLEROSIS (HCC): Primary | ICD-10-CM

## 2019-12-04 PROCEDURE — 96365 THER/PROPH/DIAG IV INF INIT: CPT

## 2019-12-04 NOTE — PROGRESS NOTES
Pt to infusion for monthly Tysabri for MS. Arrived ambulating with use of cane. Brace present right leg. . Denies any recent changes in her condition. Continues to c/o fatigue. .  Infusion given over 1 hour , then 60minute observation.  No adverse reaction

## 2019-12-10 RX ORDER — DALFAMPRIDINE 10 MG/1
TABLET, FILM COATED, EXTENDED RELEASE ORAL
Qty: 180 TABLET | Refills: 2 | Status: SHIPPED | OUTPATIENT
Start: 2019-12-10 | End: 2020-01-21

## 2019-12-12 NOTE — TELEPHONE ENCOUNTER
Jina from [facility] stating the patient may need a PA for her Ampyra 10mg. She will be faxing over form to be completed.

## 2019-12-20 DIAGNOSIS — R32: ICD-10-CM

## 2019-12-20 RX ORDER — OMEPRAZOLE 20 MG/1
CAPSULE, DELAYED RELEASE ORAL
Qty: 90 CAPSULE | Refills: 0 | Status: SHIPPED | OUTPATIENT
Start: 2019-12-20 | End: 2020-03-23

## 2019-12-20 RX ORDER — TOLTERODINE 4 MG/1
CAPSULE, EXTENDED RELEASE ORAL
Qty: 90 CAPSULE | Refills: 0 | Status: SHIPPED | OUTPATIENT
Start: 2019-12-20 | End: 2020-01-22

## 2019-12-26 RX ORDER — FLUCONAZOLE 150 MG/1
TABLET ORAL
Qty: 2 TABLET | Refills: 0 | OUTPATIENT
Start: 2019-12-26

## 2019-12-27 ENCOUNTER — TELEPHONE (OUTPATIENT)
Dept: NEUROLOGY | Facility: CLINIC | Age: 44
End: 2019-12-27

## 2019-12-30 NOTE — TELEPHONE ENCOUNTER
Request for new leg rest for wheel chair from [facility]. Form has been filled out by patient. Letter required confirming MS diagnosis. Letter placed on Dr. Blancas [place] desk for signature.

## 2020-01-02 ENCOUNTER — APPOINTMENT (OUTPATIENT)
Dept: HEMATOLOGY/ONCOLOGY | Facility: HOSPITAL | Age: 45
End: 2020-01-02
Attending: Other
Payer: COMMERCIAL

## 2020-01-14 ENCOUNTER — OFFICE VISIT (OUTPATIENT)
Dept: HEMATOLOGY/ONCOLOGY | Facility: HOSPITAL | Age: 45
End: 2020-01-14
Attending: Other
Payer: COMMERCIAL

## 2020-01-14 VITALS
SYSTOLIC BLOOD PRESSURE: 120 MMHG | OXYGEN SATURATION: 100 % | TEMPERATURE: 98 F | DIASTOLIC BLOOD PRESSURE: 78 MMHG | RESPIRATION RATE: 16 BRPM | HEART RATE: 88 BPM

## 2020-01-14 DIAGNOSIS — G35 MULTIPLE SCLEROSIS (HCC): Primary | ICD-10-CM

## 2020-01-14 PROCEDURE — 96365 THER/PROPH/DIAG IV INF INIT: CPT

## 2020-01-14 RX ORDER — COVID-19 ANTIGEN TEST
220 KIT MISCELLANEOUS DAILY
COMMUNITY
End: 2020-01-22 | Stop reason: ALTCHOICE

## 2020-01-14 RX ORDER — GARLIC EXTRACT 500 MG
1 CAPSULE ORAL DAILY
COMMUNITY
End: 2020-02-12

## 2020-01-14 NOTE — PROGRESS NOTES
Pt to infusion for monthly Tysabri for MS. Arrived ambulating with use of cane. Brace present right leg. Denies any recent changes in her condition. PIV to left FA with good blood return. Online Touch Program checklist completed.     Infusion given

## 2020-01-21 ENCOUNTER — OFFICE VISIT (OUTPATIENT)
Dept: NEUROLOGY | Facility: CLINIC | Age: 45
End: 2020-01-21
Payer: COMMERCIAL

## 2020-01-21 VITALS
HEIGHT: 68 IN | WEIGHT: 170 LBS | SYSTOLIC BLOOD PRESSURE: 122 MMHG | BODY MASS INDEX: 25.76 KG/M2 | DIASTOLIC BLOOD PRESSURE: 80 MMHG

## 2020-01-21 DIAGNOSIS — G35 MS (MULTIPLE SCLEROSIS) (HCC): Primary | ICD-10-CM

## 2020-01-21 PROCEDURE — 99214 OFFICE O/P EST MOD 30 MIN: CPT | Performed by: OTHER

## 2020-01-21 RX ORDER — GABAPENTIN 100 MG/1
CAPSULE ORAL
Qty: 270 CAPSULE | Refills: 5 | Status: SHIPPED | OUTPATIENT
Start: 2020-01-21 | End: 2020-02-12

## 2020-01-21 RX ORDER — DALFAMPRIDINE 10 MG/1
1 TABLET, FILM COATED, EXTENDED RELEASE ORAL 2 TIMES DAILY
Qty: 180 TABLET | Refills: 3 | Status: SHIPPED | OUTPATIENT
Start: 2020-01-21 | End: 2020-09-24

## 2020-01-21 RX ORDER — ZOLPIDEM TARTRATE 5 MG/1
5 TABLET ORAL NIGHTLY PRN
Qty: 90 TABLET | Refills: 3 | Status: SHIPPED | OUTPATIENT
Start: 2020-01-21 | End: 2020-04-14

## 2020-01-21 RX ORDER — TOPIRAMATE 25 MG/1
TABLET ORAL
Qty: 90 TABLET | Refills: 5 | Status: SHIPPED | OUTPATIENT
Start: 2020-01-21 | End: 2020-07-20

## 2020-01-21 RX ORDER — TOPIRAMATE 25 MG/1
TABLET ORAL
Qty: 90 TABLET | Refills: 5 | Status: SHIPPED | OUTPATIENT
Start: 2020-01-21 | End: 2020-01-21

## 2020-01-21 RX ORDER — GABAPENTIN 100 MG/1
CAPSULE ORAL
Qty: 270 CAPSULE | Refills: 5 | Status: SHIPPED
Start: 2020-01-21 | End: 2020-01-21

## 2020-01-21 NOTE — PROGRESS NOTES
Neurology follow-up visit     Referred By: Dr. Vasquez ref. provider found    Chief Complaint: Patient presents with:  Multiple Sclerosis: LOV: 10/10/19. F/U on MS. Patient states that she is declining. She states she is getting weaker and more fatigue.  She s clonazepam.  It seems that her insurance are now in the beginning of 2019 and she had to switch to a new insurance and that is why she ended up in our clinic at that time. She was expressing desire to go back on Tysabri.   She reports that her BECCA virus was topiramate 25 MG Oral Tab, Start with 1 pill QHS, for 1 week, then 2 pills qhs foor another week, then 3 pills qhs, Disp: 90 tablet, Rfl: 5  •  gabapentin 100 MG Oral Cap, Start with 1 pill TID for 1 week, then 2 pills TID for another week, then 3 pills TI vocabulary    Cranial Nerves:  II.- Visual fields full to confrontation        Fundoscopically- No papilledema or retinal hemorrhages. Normal optic discs, sharp edges. III, IV, VI- EOM intact, CORNELIUS  V. Facial sensation decreased in the right side  VII.  Agustin Tyson since it was not checked last time yet. She forgot to do it. In the meantime we will try her on gabapentin slowly tapering up the dose.   If it works well none of them in the month I will start her on topiramate also to try to help with her weight loss an

## 2020-01-22 ENCOUNTER — OFFICE VISIT (OUTPATIENT)
Dept: FAMILY MEDICINE CLINIC | Facility: CLINIC | Age: 45
End: 2020-01-22
Payer: COMMERCIAL

## 2020-01-22 ENCOUNTER — APPOINTMENT (OUTPATIENT)
Dept: LAB | Facility: REFERENCE LAB | Age: 45
End: 2020-01-22
Attending: FAMILY MEDICINE
Payer: COMMERCIAL

## 2020-01-22 VITALS
HEART RATE: 83 BPM | HEIGHT: 68 IN | BODY MASS INDEX: 25.61 KG/M2 | DIASTOLIC BLOOD PRESSURE: 82 MMHG | WEIGHT: 169 LBS | TEMPERATURE: 98 F | OXYGEN SATURATION: 98 % | SYSTOLIC BLOOD PRESSURE: 110 MMHG | RESPIRATION RATE: 17 BRPM

## 2020-01-22 DIAGNOSIS — G35 MULTIPLE SCLEROSIS (HCC): ICD-10-CM

## 2020-01-22 DIAGNOSIS — N32.81 OVERACTIVE BLADDER: ICD-10-CM

## 2020-01-22 DIAGNOSIS — R53.83 OTHER FATIGUE: ICD-10-CM

## 2020-01-22 DIAGNOSIS — R94.4 ABNORMAL RENAL FUNCTION TEST: ICD-10-CM

## 2020-01-22 DIAGNOSIS — R32: ICD-10-CM

## 2020-01-22 DIAGNOSIS — R21 RASH: Primary | ICD-10-CM

## 2020-01-22 DIAGNOSIS — L71.9 ROSACEA: ICD-10-CM

## 2020-01-22 LAB
ANION GAP SERPL CALC-SCNC: 6 MMOL/L (ref 0–18)
BASOPHILS # BLD AUTO: 0.05 X10(3) UL (ref 0–0.2)
BASOPHILS NFR BLD AUTO: 0.7 %
BUN BLD-MCNC: 14 MG/DL (ref 7–18)
BUN/CREAT SERPL: 17.7 (ref 10–20)
CALCIUM BLD-MCNC: 8.8 MG/DL (ref 8.5–10.1)
CHLORIDE SERPL-SCNC: 108 MMOL/L (ref 98–112)
CO2 SERPL-SCNC: 25 MMOL/L (ref 21–32)
CREAT BLD-MCNC: 0.79 MG/DL (ref 0.55–1.02)
DEPRECATED RDW RBC AUTO: 45.1 FL (ref 35.1–46.3)
EOSINOPHIL # BLD AUTO: 0.1 X10(3) UL (ref 0–0.7)
EOSINOPHIL NFR BLD AUTO: 1.4 %
ERYTHROCYTE [DISTWIDTH] IN BLOOD BY AUTOMATED COUNT: 13.9 % (ref 11–15)
GLUCOSE BLD-MCNC: 91 MG/DL (ref 70–99)
HCT VFR BLD AUTO: 35.5 % (ref 35–48)
HGB BLD-MCNC: 12.4 G/DL (ref 12–16)
IMM GRANULOCYTES # BLD AUTO: 0.03 X10(3) UL (ref 0–1)
IMM GRANULOCYTES NFR BLD: 0.4 %
LYMPHOCYTES # BLD AUTO: 2.57 X10(3) UL (ref 1–4)
LYMPHOCYTES NFR BLD AUTO: 34.9 %
MCH RBC QN AUTO: 31.1 PG (ref 26–34)
MCHC RBC AUTO-ENTMCNC: 34.9 G/DL (ref 31–37)
MCV RBC AUTO: 89 FL (ref 80–100)
MONOCYTES # BLD AUTO: 0.94 X10(3) UL (ref 0.1–1)
MONOCYTES NFR BLD AUTO: 12.8 %
NEUTROPHILS # BLD AUTO: 3.68 X10 (3) UL (ref 1.5–7.7)
NEUTROPHILS # BLD AUTO: 3.68 X10(3) UL (ref 1.5–7.7)
NEUTROPHILS NFR BLD AUTO: 49.8 %
OSMOLALITY SERPL CALC.SUM OF ELEC: 288 MOSM/KG (ref 275–295)
PATIENT FASTING Y/N/NP: YES
PLATELET # BLD AUTO: 205 10(3)UL (ref 150–450)
POTASSIUM SERPL-SCNC: 3.7 MMOL/L (ref 3.5–5.1)
RBC # BLD AUTO: 3.99 X10(6)UL (ref 3.8–5.3)
SODIUM SERPL-SCNC: 139 MMOL/L (ref 136–145)
TSI SER-ACNC: 1.37 MIU/ML (ref 0.36–3.74)
WBC # BLD AUTO: 7.4 X10(3) UL (ref 4–11)

## 2020-01-22 PROCEDURE — 84443 ASSAY THYROID STIM HORMONE: CPT | Performed by: FAMILY MEDICINE

## 2020-01-22 PROCEDURE — 85025 COMPLETE CBC W/AUTO DIFF WBC: CPT | Performed by: FAMILY MEDICINE

## 2020-01-22 PROCEDURE — 82306 VITAMIN D 25 HYDROXY: CPT | Performed by: FAMILY MEDICINE

## 2020-01-22 PROCEDURE — 99214 OFFICE O/P EST MOD 30 MIN: CPT | Performed by: FAMILY MEDICINE

## 2020-01-22 PROCEDURE — 80048 BASIC METABOLIC PNL TOTAL CA: CPT | Performed by: FAMILY MEDICINE

## 2020-01-22 PROCEDURE — 36415 COLL VENOUS BLD VENIPUNCTURE: CPT | Performed by: FAMILY MEDICINE

## 2020-01-22 RX ORDER — MOMETASONE 50 UG/1
2 SPRAY, METERED NASAL DAILY
Qty: 1 BOTTLE | Refills: 3 | Status: SHIPPED | OUTPATIENT
Start: 2020-01-22 | End: 2020-01-30

## 2020-01-22 RX ORDER — FLUCONAZOLE 150 MG/1
150 TABLET ORAL ONCE
Qty: 2 TABLET | Refills: 2 | Status: SHIPPED | OUTPATIENT
Start: 2020-01-22 | End: 2020-01-22

## 2020-01-22 RX ORDER — DOXYCYCLINE 40 MG/1
40 CAPSULE ORAL EVERY MORNING
Qty: 30 CAPSULE | Refills: 1 | Status: SHIPPED | OUTPATIENT
Start: 2020-01-22 | End: 2020-01-30

## 2020-01-22 RX ORDER — DARIFENACIN HYDROBROMIDE 15 MG/1
15 TABLET, EXTENDED RELEASE ORAL DAILY
Qty: 30 TABLET | Refills: 0 | Status: SHIPPED | OUTPATIENT
Start: 2020-01-22 | End: 2020-02-12 | Stop reason: ALTCHOICE

## 2020-01-22 NOTE — PROGRESS NOTES
HPI:   Patient presents with:  Derm Problem: L leg rash for 1 week  Bladder Problem: MS - overactive bladder, increase rx  Rosacea      Lazaro Allison is a 40year old female.     She primarily presents for:  Rash  Location: Left lower leg  Has had the fo Medications:  Current Outpatient Medications   Medication Sig Dispense Refill   • Doxycycline 40 MG Oral Capsule Delayed Release Take 1 capsule (40 mg total) by mouth every morning.  30 capsule 1   • fluconazole 150 MG Oral Tab Take 1 tablet (150 mg t Currently      Frequency: Never    Drug use: Not Currently        REVIEW OF SYSTEMS:   Pertinent positives and negatives noted in the the HPI-Specifically:  GEN:  No fever, no weight changes  HEAD:  No worsening headaches or dizziness  EYES:  No new vision significant change from last exam, chronic findings   PSYCH:  mood and affect WNL, speech and thought congruent    ASSESSMENT AND PLAN:   Patient is a 40year old female who presents primarily presents for:     Additional Assessment and Plan:  1.  Rash  Lef Release 30 capsule 1     Sig: Take 1 capsule (40 mg total) by mouth every morning. • fluconazole 150 MG Oral Tab 2 tablet 2     Sig: Take 1 tablet (150 mg total) by mouth once for 1 dose.  And one in 2 days   • Darifenacin Hydrobromide ER 15 MG Oral Table

## 2020-01-24 LAB — 25(OH)D3 SERPL-MCNC: 34.2 NG/ML (ref 30–100)

## 2020-01-27 DIAGNOSIS — E55.9 VITAMIN D DEFICIENCY: Primary | ICD-10-CM

## 2020-02-06 LAB
INDEX VALUE: 0.15
JCV ANTIBODY: NEGATIVE

## 2020-02-10 ENCOUNTER — TELEPHONE (OUTPATIENT)
Dept: NEUROLOGY | Facility: CLINIC | Age: 45
End: 2020-02-10

## 2020-02-10 NOTE — TELEPHONE ENCOUNTER
----- Message from Suri Harkins MD sent at 2/10/2020  7:26 AM CST -----  Please let the patient know that results of these particular lab tests so far were normal.    Thank you

## 2020-02-10 NOTE — TELEPHONE ENCOUNTER
Left detailed message for patient notifying her of below. Asked he to call the office if she had questions.

## 2020-02-11 ENCOUNTER — OFFICE VISIT (OUTPATIENT)
Dept: HEMATOLOGY/ONCOLOGY | Facility: HOSPITAL | Age: 45
End: 2020-02-11
Attending: Other
Payer: COMMERCIAL

## 2020-02-11 VITALS
RESPIRATION RATE: 16 BRPM | SYSTOLIC BLOOD PRESSURE: 114 MMHG | BODY MASS INDEX: 25 KG/M2 | OXYGEN SATURATION: 100 % | TEMPERATURE: 98 F | DIASTOLIC BLOOD PRESSURE: 64 MMHG | HEART RATE: 83 BPM | WEIGHT: 164.44 LBS

## 2020-02-11 DIAGNOSIS — G35 MULTIPLE SCLEROSIS (HCC): Primary | ICD-10-CM

## 2020-02-11 PROCEDURE — 96365 THER/PROPH/DIAG IV INF INIT: CPT

## 2020-02-12 ENCOUNTER — OFFICE VISIT (OUTPATIENT)
Dept: FAMILY MEDICINE CLINIC | Facility: CLINIC | Age: 45
End: 2020-02-12
Payer: COMMERCIAL

## 2020-02-12 VITALS
SYSTOLIC BLOOD PRESSURE: 100 MMHG | BODY MASS INDEX: 25.01 KG/M2 | WEIGHT: 165 LBS | HEIGHT: 68 IN | HEART RATE: 83 BPM | OXYGEN SATURATION: 99 % | RESPIRATION RATE: 18 BRPM | DIASTOLIC BLOOD PRESSURE: 72 MMHG

## 2020-02-12 DIAGNOSIS — N32.81 OVERACTIVE BLADDER: ICD-10-CM

## 2020-02-12 DIAGNOSIS — G35 MULTIPLE SCLEROSIS (HCC): ICD-10-CM

## 2020-02-12 DIAGNOSIS — R87.610 PAP SMEAR ABNORMALITY OF CERVIX WITH ASCUS FAVORING BENIGN: Primary | ICD-10-CM
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
## 2020-02-12 PROCEDURE — 99214 OFFICE O/P EST MOD 30 MIN: CPT | Performed by: FAMILY MEDICINE

## 2020-02-12 PROCEDURE — 87624 HPV HI-RISK TYP POOLED RSLT: CPT | Performed by: FAMILY MEDICINE

## 2020-02-12 RX ORDER — FLUCONAZOLE 150 MG/1
TABLET ORAL
COMMUNITY
Start: 2020-01-22 | End: 2020-08-07

## 2020-02-12 RX ORDER — BETAMETHASONE DIPROPIONATE 0.5 MG/G
OINTMENT TOPICAL
Qty: 50 G | Refills: 2 | Status: SHIPPED | OUTPATIENT
Start: 2020-02-12 | End: 2020-08-28

## 2020-02-12 RX ORDER — OXYBUTYNIN CHLORIDE 5 MG/1
TABLET, EXTENDED RELEASE ORAL
Qty: 180 TABLET | Refills: 1 | Status: SHIPPED | OUTPATIENT
Start: 2020-02-12 | End: 2020-04-28 | Stop reason: DRUGHIGH

## 2020-02-13 LAB — HPV I/H RISK 1 DNA SPEC QL NAA+PROBE: NEGATIVE

## 2020-02-14 NOTE — PROGRESS NOTES
HPI:   Patient presents with:  Gyn Exam: pap smear  Other: within the last 2 weeks patient has fallen. Patient did not go to the hospital  New Prescription: would like a new bladder medication, the enablex does not work and gives her dry mouth.       Debr % External Ointment Apply to AA of left lower leg bid prn 50 g 2   • Oxybutynin Chloride ER 5 MG Oral Tablet 24 Hr 1-3 tabs po qhs 180 tablet 1   • natalizumab 300 MG/15ML Intravenous Conc Inject 15 mL (300 mg total) into the vein every 30 (thirty) days.  D pain  MOUTH/THROAT:  No sore throat or dental problems, no oral lesions  HEART:  No chest pain or palpitations  LUNG:  No SOB, cough or wheeze  GI:  No abdominal pain.   No GI/rectal bleeding, No N/V/D/C  :  No dysuria/hematuria  MS: See HPI   NEURO: No w qhs  Dispense: 180 tablet; Refill: 1    3. Multiple sclerosis ([facility] with recent more rapidly worsening  Follow-up with neuro as this time    4.  Fall, initial encounter  X2, may be due to trial of gabapentin which was stopped due to side effects versus wors

## 2020-02-24 ENCOUNTER — TELEPHONE (OUTPATIENT)
Dept: NEUROLOGY | Facility: CLINIC | Age: 45
End: 2020-02-24

## 2020-02-24 NOTE — TELEPHONE ENCOUNTER
Received in mail physician statement for disabled person [place] exemption. Form placed in Dr Vane Thomas for review. Will mail form back to patient after his review. Patient did not sign release of information to send to [facility] ([facility]).

## 2020-02-26 ENCOUNTER — TELEPHONE (OUTPATIENT)
Dept: FAMILY MEDICINE CLINIC | Facility: CLINIC | Age: 45
End: 2020-02-26

## 2020-02-26 ENCOUNTER — TELEPHONE (OUTPATIENT)
Dept: NEUROLOGY | Facility: CLINIC | Age: 45
End: 2020-02-26

## 2020-02-26 ENCOUNTER — MED REC SCAN ONLY (OUTPATIENT)
Dept: NEUROLOGY | Facility: CLINIC | Age: 45
End: 2020-02-26

## 2020-02-26 ENCOUNTER — PATIENT MESSAGE (OUTPATIENT)
Dept: FAMILY MEDICINE CLINIC | Facility: CLINIC | Age: 45
End: 2020-02-26

## 2020-02-26 DIAGNOSIS — N30.00 ACUTE CYSTITIS WITHOUT HEMATURIA: Primary | ICD-10-CM

## 2020-02-26 RX ORDER — NITROFURANTOIN 25; 75 MG/1; MG/1
100 CAPSULE ORAL 2 TIMES DAILY
Qty: 10 CAPSULE | Refills: 0 | Status: SHIPPED | OUTPATIENT
Start: 2020-02-26 | End: 2020-08-07

## 2020-02-26 NOTE — TELEPHONE ENCOUNTER
Patient has a history of overactive bladder related to her MS. However, in the past 2 days, she reports increased frequency, nocturia, dysuria. No blood in urine, denies known fever/rigors.  She estimates she has already urinated 10 times since awakening th

## 2020-02-26 NOTE — TELEPHONE ENCOUNTER
----- Message from Briseida Abad, [address] [place] [address] sent at 2/26/2020  9:51 AM CST -----  Regarding: FW: Prescription Question  Contact: [phone]    ----- Message -----  From: Chapito Strong  Sent: 2/25/2020   2:11 PM CST  To: [facility] [address] Dr. Farrah Crabtree

## 2020-02-26 NOTE — TELEPHONE ENCOUNTER
RN called patients contact # with no VM to leave message, so sen  t patient a message through [facility]. Messages have been forwarded to Dr Virgen Montoya and VIET Delarosa.     Aashish valdes that you are having UTI symptoms, I tried leaving you a VM but there was no

## 2020-02-26 NOTE — TELEPHONE ENCOUNTER
From: Fortino Singh  To: Seth Kumar MD  Sent: 2/26/2020 2:02 PM CST  Subject: Other    First pill and I feel better. Thanks.

## 2020-02-28 ENCOUNTER — TELEPHONE (OUTPATIENT)
Dept: FAMILY MEDICINE CLINIC | Facility: CLINIC | Age: 45
End: 2020-02-28

## 2020-02-28 NOTE — TELEPHONE ENCOUNTER
Rec'd mammogram results from [facility] dated 2/26/2020. Assessment: Negative, no evidence of malignancy. 1 year mammogram recommended. Placed on Dr. Shante Pablo desamanda for review. Entered in .

## 2020-03-03 ENCOUNTER — TELEPHONE (OUTPATIENT)
Dept: NEUROLOGY | Facility: CLINIC | Age: 45
End: 2020-03-03

## 2020-03-05 ENCOUNTER — PATIENT MESSAGE (OUTPATIENT)
Dept: NEUROLOGY | Facility: CLINIC | Age: 45
End: 2020-03-05

## 2020-03-05 RX ORDER — DALFAMPRIDINE 10 MG/1
1 TABLET, FILM COATED, EXTENDED RELEASE ORAL 2 TIMES DAILY
Qty: 180 TABLET | Refills: 3 | Status: SHIPPED | OUTPATIENT
Start: 2020-03-05 | End: 2020-04-04

## 2020-03-05 NOTE — TELEPHONE ENCOUNTER
From: Fortino Singh  To: Ninfa Kovacs MD  Sent: 3/5/2020 8:55 AM CST  Subject: Prescription Question    Hi I have Medicaid blue cross now. Xog [phone]. Group [facility] [id number]. Please prescribe generic ampyra. Thank you.

## 2020-03-06 ENCOUNTER — HOSPITAL (OUTPATIENT)
Dept: OTHER | Age: 45
End: 2020-03-06

## 2020-03-06 LAB
APPEARANCE UR: CLEAR
BACTERIA #/AREA URNS HPF: ABNORMAL /HPF
BILIRUB UR QL: NEGATIVE
COLOR UR: ABNORMAL
GLUCOSE UR-MCNC: NEGATIVE MG/DL
HCG POINT OF CARE (5HGRST): NEGATIVE
HGB UR QL: NEGATIVE
HYALINE CASTS #/AREA URNS LPF: ABNORMAL /LPF (ref 0–5)
KETONES UR-MCNC: NEGATIVE MG/DL
LEUKOCYTE ESTERASE UR QL STRIP: NEGATIVE
NITRITE UR QL: NEGATIVE
PH UR: 6.5 UNITS (ref 5–7)
PROT UR QL: NEGATIVE MG/DL
RBC #/AREA URNS HPF: ABNORMAL /HPF (ref 0–2)
SP GR UR: <1.005 (ref 1–1.03)
SPECIMEN SOURCE: ABNORMAL
SQUAMOUS #/AREA URNS HPF: ABNORMAL /HPF (ref 0–5)
UROBILINOGEN UR QL: 0.2 MG/DL (ref 0–1)
WBC #/AREA URNS HPF: ABNORMAL /HPF (ref 0–5)

## 2020-03-06 PROCEDURE — 99283 EMERGENCY DEPT VISIT LOW MDM: CPT | Performed by: EMERGENCY MEDICINE

## 2020-03-09 ENCOUNTER — MED REC SCAN ONLY (OUTPATIENT)
Dept: NEUROLOGY | Facility: CLINIC | Age: 45
End: 2020-03-09

## 2020-03-10 ENCOUNTER — TELEPHONE (OUTPATIENT)
Dept: HEMATOLOGY/ONCOLOGY | Facility: HOSPITAL | Age: 45
End: 2020-03-10

## 2020-03-10 ENCOUNTER — PATIENT MESSAGE (OUTPATIENT)
Dept: FAMILY MEDICINE CLINIC | Facility: CLINIC | Age: 45
End: 2020-03-10

## 2020-03-10 ENCOUNTER — OFFICE VISIT (OUTPATIENT)
Dept: HEMATOLOGY/ONCOLOGY | Facility: HOSPITAL | Age: 45
End: 2020-03-10
Attending: Other
Payer: MEDICAID

## 2020-03-10 VITALS
RESPIRATION RATE: 16 BRPM | BODY MASS INDEX: 25 KG/M2 | DIASTOLIC BLOOD PRESSURE: 67 MMHG | OXYGEN SATURATION: 100 % | HEART RATE: 83 BPM | WEIGHT: 165 LBS | SYSTOLIC BLOOD PRESSURE: 112 MMHG | TEMPERATURE: 98 F

## 2020-03-10 DIAGNOSIS — M25.50 ARTHRALGIA, UNSPECIFIED JOINT: ICD-10-CM

## 2020-03-10 DIAGNOSIS — G35 MULTIPLE SCLEROSIS (HCC): Primary | ICD-10-CM

## 2020-03-10 DIAGNOSIS — M62.471 CONTRACTURE OF MUSCLE OF RIGHT FOOT: ICD-10-CM

## 2020-03-10 PROCEDURE — 96365 THER/PROPH/DIAG IV INF INIT: CPT

## 2020-03-10 NOTE — TELEPHONE ENCOUNTER
From: Ashley Grande  To: Jewel Gaucher, MD  Sent: 3/10/2020 1:01 PM CDT  Subject: Referral Request    I need a referral for Dr Cecilia Zamorano orthopedic plz. Going Thurs. Thx

## 2020-03-10 NOTE — PROGRESS NOTES
Mignon Mortimer presents for tysabri infusion. Appropriate for treatment within checklist. No worsening of strength or balance. Using cane. PIV established with brisk blood return noted. Tysabri infused with no s/s of adverse reaction.  Does not stay for the one h

## 2020-03-23 RX ORDER — OMEPRAZOLE 20 MG/1
CAPSULE, DELAYED RELEASE ORAL
Qty: 90 CAPSULE | Refills: 0 | Status: SHIPPED | OUTPATIENT
Start: 2020-03-23 | End: 2020-07-20

## 2020-03-23 NOTE — TELEPHONE ENCOUNTER
A refill request was received for:  Requested Prescriptions     Pending Prescriptions Disp Refills   • OMEPRAZOLE 20 MG Oral Capsule Delayed Release [Pharmacy Med Name: OMEPRAZOLE 20MG CAPSULES] 90 capsule 0     Sig: TAKE 1 CAPSULE(20 MG) BY MOUTH EVERY MO

## 2020-04-07 ENCOUNTER — OFFICE VISIT (OUTPATIENT)
Dept: HEMATOLOGY/ONCOLOGY | Facility: HOSPITAL | Age: 45
End: 2020-04-07
Attending: Other
Payer: MEDICAID

## 2020-04-07 DIAGNOSIS — G35 MULTIPLE SCLEROSIS (HCC): Primary | ICD-10-CM

## 2020-04-07 PROCEDURE — 96365 THER/PROPH/DIAG IV INF INIT: CPT

## 2020-04-07 NOTE — PROGRESS NOTES
Mignon Mortimer presents for tysabri infusion. Ambulating with slow gait with use of cane and knee brace. PIV established with brisk blood return noted. Tysabri infused with no s/s of adverse reaction. Pt does not wish to stay  for the one hour observation.  To con

## 2020-04-14 RX ORDER — ZOLPIDEM TARTRATE 5 MG/1
TABLET ORAL
Qty: 90 TABLET | Refills: 0 | Status: SHIPPED | OUTPATIENT
Start: 2020-04-14 | End: 2020-07-20

## 2020-04-14 NOTE — TELEPHONE ENCOUNTER
Refill request for zolpidem 5 mg, take 1 tab nightly, #90, 1 refill    LOV: 1/21/20  NOV: none  Last refilled on 3/16/20 for 30 day supply

## 2020-04-20 ENCOUNTER — PATIENT MESSAGE (OUTPATIENT)
Dept: NEUROLOGY | Facility: CLINIC | Age: 45
End: 2020-04-20

## 2020-04-20 ENCOUNTER — TELEPHONE (OUTPATIENT)
Dept: FAMILY MEDICINE CLINIC | Facility: CLINIC | Age: 45
End: 2020-04-20

## 2020-04-20 DIAGNOSIS — G35 MS (MULTIPLE SCLEROSIS) (HCC): ICD-10-CM

## 2020-04-20 NOTE — TELEPHONE ENCOUNTER
Refill request for clonazepam 0.5 mg, take 1 tab daily, #90, 1 refill    LOV: 1/21/20  NOV: none  Last refilled on 3/18/20 for 30 day supply

## 2020-04-20 NOTE — TELEPHONE ENCOUNTER
Received prior authorization denial from cover my meds. She is able to get 30 tablets in 30 days. They are not approving tid. I called the pharmacy to let them know. The patient can pay for more out of pocket if needed.    Spoke to [employer] he

## 2020-04-20 NOTE — TELEPHONE ENCOUNTER
From: Akanksha Smiley  To: Sherren Holly, MD  Sent: 4/20/2020 8:22 AM CDT  Subject: Prescription Question    I need clonazapam asap.  [facility] has been requesting it

## 2020-04-21 RX ORDER — CLONAZEPAM 0.5 MG/1
0.5 TABLET ORAL DAILY
Qty: 90 TABLET | Refills: 1 | Status: SHIPPED | OUTPATIENT
Start: 2020-04-21 | End: 2020-10-15

## 2020-04-28 ENCOUNTER — TELEPHONE (OUTPATIENT)
Dept: FAMILY MEDICINE CLINIC | Facility: CLINIC | Age: 45
End: 2020-04-28

## 2020-04-28 RX ORDER — OXYBUTYNIN CHLORIDE 10 MG/1
10 TABLET, EXTENDED RELEASE ORAL DAILY
Qty: 90 TABLET | Refills: 1 | Status: SHIPPED | OUTPATIENT
Start: 2020-04-28 | End: 2020-05-06 | Stop reason: DRUGHIGH

## 2020-04-28 NOTE — TELEPHONE ENCOUNTER
[facility] faxed a message to let us know insurance only covers one tablet daily, Rx written for 2 tablets per day . Please change RX to oxybutynin ER 10 mg. Thank you     I pended the order but is not sure how much you wanted to prescribe to patient.     Bernett Scheuermann

## 2020-05-05 ENCOUNTER — OFFICE VISIT (OUTPATIENT)
Dept: HEMATOLOGY/ONCOLOGY | Facility: HOSPITAL | Age: 45
End: 2020-05-05
Attending: Other
Payer: MEDICAID

## 2020-05-05 VITALS
WEIGHT: 161 LBS | HEART RATE: 89 BPM | TEMPERATURE: 97 F | SYSTOLIC BLOOD PRESSURE: 107 MMHG | HEIGHT: 68 IN | RESPIRATION RATE: 16 BRPM | DIASTOLIC BLOOD PRESSURE: 77 MMHG | BODY MASS INDEX: 24.4 KG/M2 | OXYGEN SATURATION: 99 %

## 2020-05-05 DIAGNOSIS — G35 MULTIPLE SCLEROSIS (HCC): Primary | ICD-10-CM

## 2020-05-05 PROCEDURE — 96365 THER/PROPH/DIAG IV INF INIT: CPT

## 2020-05-05 NOTE — PROGRESS NOTES
Tawana Nails presents for tysabri infusion. Ambulating with slow gait with use of walker and knee brace. She recently started to use the walker , but feels like it bothers her knee more than the cane did. No other complaints today.    PIV established with brisk bl

## 2020-05-06 RX ORDER — OXYBUTYNIN CHLORIDE 15 MG/1
15 TABLET, EXTENDED RELEASE ORAL DAILY
Qty: 90 TABLET | Refills: 1 | Status: SHIPPED | OUTPATIENT
Start: 2020-05-06 | End: 2020-09-24

## 2020-05-06 NOTE — TELEPHONE ENCOUNTER
Patient is looking to get her oxybutynin er 5 mg tablets. It is written out for 1-3 tablets at bedtime. Her insurance denied the 5mg but if you send it to the [facility] for 10 mg or 15 mg nightly they will pay for it.     I pended the medication for you

## 2020-05-14 NOTE — TELEPHONE ENCOUNTER
Medication request: Paroxetine 20mg 1 TAB QAM    LOV: 01/21/20  NOV: none    [place] 05[place]28/19 #90 r-3

## 2020-05-15 RX ORDER — PAROXETINE HYDROCHLORIDE 20 MG/1
TABLET, FILM COATED ORAL
Qty: 90 TABLET | Refills: 0 | Status: SHIPPED | OUTPATIENT
Start: 2020-05-15 | End: 2020-08-12

## 2020-06-01 ENCOUNTER — TELEPHONE (OUTPATIENT)
Dept: NEUROLOGY | Facility: CLINIC | Age: 45
End: 2020-06-01

## 2020-06-02 ENCOUNTER — OFFICE VISIT (OUTPATIENT)
Dept: HEMATOLOGY/ONCOLOGY | Facility: HOSPITAL | Age: 45
End: 2020-06-02
Attending: Other
Payer: MEDICAID

## 2020-06-02 VITALS
HEART RATE: 72 BPM | OXYGEN SATURATION: 99 % | SYSTOLIC BLOOD PRESSURE: 109 MMHG | RESPIRATION RATE: 16 BRPM | DIASTOLIC BLOOD PRESSURE: 72 MMHG | BODY MASS INDEX: 24 KG/M2 | WEIGHT: 161 LBS

## 2020-06-02 DIAGNOSIS — G35 MULTIPLE SCLEROSIS (HCC): Primary | ICD-10-CM

## 2020-06-02 PROCEDURE — 96365 THER/PROPH/DIAG IV INF INIT: CPT

## 2020-06-02 NOTE — PROGRESS NOTES
Lluvia to infusion for Q4 week Tysabri infusion. Arrives ambulating with slow gait with use of walker and knee brace. Overall discomfort, mostly behind the knee and up the leg. TOUCH program checklist completed and patient appropriate for infusion today.  Savi Nobles

## 2020-06-16 RX ORDER — DOXYCYCLINE HYCLATE 50 MG/1
CAPSULE ORAL
Qty: 180 CAPSULE | Refills: 0 | Status: SHIPPED | OUTPATIENT
Start: 2020-06-16 | End: 2020-09-16

## 2020-06-16 NOTE — TELEPHONE ENCOUNTER
A refill request was received for:  Requested Prescriptions     Pending Prescriptions Disp Refills   • DOXYCYCLINE HYCLATE 50 MG Oral Cap [Pharmacy Med Name: DOXYCYCLINE HYCLATE 50MG CAPSULES] 60 capsule 3     Sig: TAKE 1 CAPSULE(50 MG) BY MOUTH TWICE JOSR

## 2020-06-29 ENCOUNTER — TELEPHONE (OUTPATIENT)
Dept: NEUROLOGY | Facility: CLINIC | Age: 45
End: 2020-06-29

## 2020-06-30 ENCOUNTER — OFFICE VISIT (OUTPATIENT)
Dept: HEMATOLOGY/ONCOLOGY | Facility: HOSPITAL | Age: 45
End: 2020-06-30
Attending: Other
Payer: MEDICAID

## 2020-06-30 VITALS
HEART RATE: 88 BPM | OXYGEN SATURATION: 100 % | BODY MASS INDEX: 25 KG/M2 | TEMPERATURE: 98 F | SYSTOLIC BLOOD PRESSURE: 105 MMHG | WEIGHT: 164 LBS | DIASTOLIC BLOOD PRESSURE: 64 MMHG

## 2020-06-30 DIAGNOSIS — G35 MULTIPLE SCLEROSIS (HCC): Primary | ICD-10-CM

## 2020-06-30 PROCEDURE — 96365 THER/PROPH/DIAG IV INF INIT: CPT

## 2020-06-30 NOTE — PROGRESS NOTES
Lluvia to infusion for Q4 week Tysabri infusion. Arrives ambulating with slow gait with use of walker and knee brace. Overall discomfort, mostly behind the knee and up the leg. TOUCH program checklist completed and patient appropriate for infusion today.  Any Rubi

## 2020-07-01 NOTE — TELEPHONE ENCOUNTER
Tysabri continuation form faxed back to [id number] at [phone]. Copy made, sent to scanning. Original placed in Neurology cabinet.

## 2020-07-20 RX ORDER — OMEPRAZOLE 20 MG/1
CAPSULE, DELAYED RELEASE ORAL
Qty: 90 CAPSULE | Refills: 0 | Status: SHIPPED | OUTPATIENT
Start: 2020-07-20 | End: 2020-08-31

## 2020-07-20 NOTE — TELEPHONE ENCOUNTER
Medication request: Zolpidem 5mg Oral Tab, #90, no refills  Take 1 tablet by mouth nightly as needed for sleep    [place]: 6/16/20    Medication request: Topiramate 25mg Oral Tab, #90, 5 refills  Take 3 tabs qHs    [place] 6/13/20    L

## 2020-07-20 NOTE — TELEPHONE ENCOUNTER
Patient due for F/U visit in August 2020 for CPE. Please schedule her and then we can refill the medication. Thanks!

## 2020-07-21 RX ORDER — TOPIRAMATE 25 MG/1
TABLET ORAL
Qty: 90 TABLET | Refills: 5 | Status: SHIPPED | OUTPATIENT
Start: 2020-07-21 | End: 2020-09-24

## 2020-07-21 RX ORDER — ZOLPIDEM TARTRATE 5 MG/1
5 TABLET ORAL NIGHTLY PRN
Qty: 90 TABLET | Refills: 0 | Status: SHIPPED | OUTPATIENT
Start: 2020-07-21 | End: 2020-09-24

## 2020-07-23 ENCOUNTER — APPOINTMENT (OUTPATIENT)
Dept: LAB | Facility: HOSPITAL | Age: 45
End: 2020-07-23
Attending: FAMILY MEDICINE
Payer: MEDICAID

## 2020-07-23 DIAGNOSIS — R30.0 DYSURIA: ICD-10-CM

## 2020-07-23 LAB
BILIRUB UR QL: NEGATIVE
CLARITY UR: CLEAR
COLOR UR: YELLOW
GLUCOSE UR-MCNC: NEGATIVE MG/DL
HGB UR QL STRIP.AUTO: NEGATIVE
KETONES UR-MCNC: NEGATIVE MG/DL
NITRITE UR QL STRIP.AUTO: NEGATIVE
PH UR: 5 [PH] (ref 5–8)
PROT UR-MCNC: NEGATIVE MG/DL
RBC #/AREA URNS AUTO: <1 /HPF
SP GR UR STRIP: 1.01 (ref 1–1.03)
UROBILINOGEN UR STRIP-ACNC: <2
WBC #/AREA URNS AUTO: 3 /HPF

## 2020-07-23 PROCEDURE — 81001 URINALYSIS AUTO W/SCOPE: CPT

## 2020-07-24 ENCOUNTER — PATIENT MESSAGE (OUTPATIENT)
Dept: FAMILY MEDICINE CLINIC | Facility: CLINIC | Age: 45
End: 2020-07-24

## 2020-07-24 DIAGNOSIS — N30.00 ACUTE CYSTITIS WITHOUT HEMATURIA: Primary | ICD-10-CM

## 2020-07-24 DIAGNOSIS — B37.3 YEAST VAGINITIS: ICD-10-CM

## 2020-07-24 RX ORDER — FLUCONAZOLE 150 MG/1
150 TABLET ORAL ONCE
Qty: 1 TABLET | Refills: 1 | Status: SHIPPED | OUTPATIENT
Start: 2020-07-24 | End: 2020-07-24

## 2020-07-24 RX ORDER — NITROFURANTOIN 25; 75 MG/1; MG/1
100 CAPSULE ORAL 2 TIMES DAILY
Qty: 10 CAPSULE | Refills: 0 | Status: SHIPPED | OUTPATIENT
Start: 2020-07-24 | End: 2020-07-29

## 2020-07-24 NOTE — TELEPHONE ENCOUNTER
Reports dysuria, frequency x 5 days. UA shows few bacteria, awaiting culture. No hematuria, no known fever or chills. Hx of UTIs intermittently, last in February 2020, responded to [employer].  Will send same, she also requests Diflucan as antibiotics caus

## 2020-07-28 ENCOUNTER — OFFICE VISIT (OUTPATIENT)
Dept: HEMATOLOGY/ONCOLOGY | Facility: HOSPITAL | Age: 45
End: 2020-07-28
Attending: Other
Payer: MEDICAID

## 2020-07-28 VITALS
DIASTOLIC BLOOD PRESSURE: 71 MMHG | TEMPERATURE: 99 F | RESPIRATION RATE: 16 BRPM | SYSTOLIC BLOOD PRESSURE: 111 MMHG | HEART RATE: 82 BPM | OXYGEN SATURATION: 100 %

## 2020-07-28 DIAGNOSIS — G35 MULTIPLE SCLEROSIS (HCC): Primary | ICD-10-CM

## 2020-07-28 PROCEDURE — 96365 THER/PROPH/DIAG IV INF INIT: CPT

## 2020-07-28 NOTE — PROGRESS NOTES
Lluvia to infusion for Q4 week Tysabri infusion. Arrives ambulating with slow gait with use of walker and knee brace. TOUCH program checklist completed and patient appropriate for infusion today. Denies any new or changes in eyesight, balance, strength.

## 2020-08-04 ENCOUNTER — PATIENT MESSAGE (OUTPATIENT)
Dept: NEUROLOGY | Facility: CLINIC | Age: 45
End: 2020-08-04

## 2020-08-04 NOTE — TELEPHONE ENCOUNTER
From: Fortino Singh  To: Ninfa Kovacs MD  Sent: 8/4/2020 1:45 PM CDT  Subject: Other    Plz fax [employer]  my ms diagnosis on prescription form for my motorized sclupe Carr. Thanks.

## 2020-08-04 NOTE — TELEPHONE ENCOUNTER
Resent order and letter for karissater to [facility] at [phone].

## 2020-08-07 ENCOUNTER — PATIENT MESSAGE (OUTPATIENT)
Dept: FAMILY MEDICINE CLINIC | Facility: CLINIC | Age: 45
End: 2020-08-07

## 2020-08-07 DIAGNOSIS — N30.00 ACUTE CYSTITIS WITHOUT HEMATURIA: ICD-10-CM

## 2020-08-07 RX ORDER — FLUCONAZOLE 150 MG/1
TABLET ORAL
Qty: 4 TABLET | Refills: 0 | Status: SHIPPED | OUTPATIENT
Start: 2020-08-07 | End: 2020-08-12

## 2020-08-07 RX ORDER — NITROFURANTOIN 25; 75 MG/1; MG/1
100 CAPSULE ORAL 2 TIMES DAILY
Qty: 14 CAPSULE | Refills: 0 | Status: SHIPPED | OUTPATIENT
Start: 2020-08-07 | End: 2020-08-28

## 2020-08-07 NOTE — TELEPHONE ENCOUNTER
HAND-OFF: 

Report given to Beth RN. Pt is still experiencing UTI symptoms with burning and frequency and the need to urinate q 5 minutes. She is asking for another antibiotic to be called into pharmacy in addition to Diflucan for the possibility of yeast infection that may develop.    Pt

## 2020-08-12 RX ORDER — FLUCONAZOLE 150 MG/1
TABLET ORAL
Qty: 4 TABLET | Refills: 0 | Status: SHIPPED | OUTPATIENT
Start: 2020-08-12 | End: 2021-01-12

## 2020-08-12 RX ORDER — PAROXETINE HYDROCHLORIDE 20 MG/1
20 TABLET, FILM COATED ORAL EVERY MORNING
Qty: 90 TABLET | Refills: 0 | Status: SHIPPED | OUTPATIENT
Start: 2020-08-12 | End: 2020-09-24

## 2020-08-12 NOTE — TELEPHONE ENCOUNTER
A refill request was received for:  Requested Prescriptions     Pending Prescriptions Disp Refills   • fluconazole 150 MG Oral Tab 4 tablet 0     Sig: TK 1 T PO ONCE FOR 1 DOSE AND TK ANOTHER T IN 2 DAYS PRN     Last refill date: 08/07/2020  Qty:4 tablets

## 2020-08-12 NOTE — TELEPHONE ENCOUNTER
Medication request: Paroxetine 20mg daily    LOV 1/21/20-f/u in 2 mos  No follow up scheduled    Last refill: 5/15/20 #90    Enefgy message sent to patient to schedule f/u

## 2020-08-24 ENCOUNTER — TELEPHONE (OUTPATIENT)
Dept: NEUROLOGY | Facility: CLINIC | Age: 45
End: 2020-08-24

## 2020-08-24 NOTE — TELEPHONE ENCOUNTER
Order placed for tysabri 300 mg/15 mL, inject 15 mL every 30 days, 15 mL, 5 refills -- printed and faxed to [facility] at [phone].       LOV: 1/21/20  NOV: none

## 2020-08-25 ENCOUNTER — OFFICE VISIT (OUTPATIENT)
Dept: HEMATOLOGY/ONCOLOGY | Facility: HOSPITAL | Age: 45
End: 2020-08-25
Attending: Other
Payer: MEDICAID

## 2020-08-25 VITALS
RESPIRATION RATE: 16 BRPM | TEMPERATURE: 98 F | OXYGEN SATURATION: 99 % | HEART RATE: 91 BPM | WEIGHT: 161 LBS | BODY MASS INDEX: 24 KG/M2 | SYSTOLIC BLOOD PRESSURE: 108 MMHG | DIASTOLIC BLOOD PRESSURE: 73 MMHG

## 2020-08-25 DIAGNOSIS — G35 MULTIPLE SCLEROSIS (HCC): Primary | ICD-10-CM

## 2020-08-25 PROCEDURE — 96365 THER/PROPH/DIAG IV INF INIT: CPT

## 2020-08-25 RX ORDER — MELATONIN
325
COMMUNITY
End: 2021-01-12

## 2020-08-28 ENCOUNTER — OFFICE VISIT (OUTPATIENT)
Dept: FAMILY MEDICINE CLINIC | Facility: CLINIC | Age: 45
End: 2020-08-28
Payer: MEDICAID

## 2020-08-28 VITALS
HEIGHT: 69 IN | OXYGEN SATURATION: 98 % | DIASTOLIC BLOOD PRESSURE: 60 MMHG | HEART RATE: 82 BPM | SYSTOLIC BLOOD PRESSURE: 102 MMHG | BODY MASS INDEX: 23.79 KG/M2 | WEIGHT: 160.63 LBS

## 2020-08-28 DIAGNOSIS — E78.5 DYSLIPIDEMIA: ICD-10-CM

## 2020-08-28 DIAGNOSIS — R87.610 ASCUS OF CERVIX WITH NEGATIVE HIGH RISK HPV: ICD-10-CM

## 2020-08-28 DIAGNOSIS — F34.1 DYSTHYMIA: ICD-10-CM

## 2020-08-28 DIAGNOSIS — R30.0 DYSURIA: ICD-10-CM

## 2020-08-28 DIAGNOSIS — F41.1 GENERALIZED ANXIETY DISORDER: ICD-10-CM

## 2020-08-28 DIAGNOSIS — Z12.31 BREAST CANCER SCREENING BY MAMMOGRAM: ICD-10-CM

## 2020-08-28 DIAGNOSIS — G35 MULTIPLE SCLEROSIS (HCC): ICD-10-CM

## 2020-08-28 DIAGNOSIS — Z00.00 ROUTINE MEDICAL EXAM: Primary | ICD-10-CM

## 2020-08-28 PROCEDURE — 3008F BODY MASS INDEX DOCD: CPT | Performed by: FAMILY MEDICINE

## 2020-08-28 PROCEDURE — 3078F DIAST BP <80 MM HG: CPT | Performed by: FAMILY MEDICINE

## 2020-08-28 PROCEDURE — 99396 PREV VISIT EST AGE 40-64: CPT | Performed by: FAMILY MEDICINE

## 2020-08-28 PROCEDURE — 3074F SYST BP LT 130 MM HG: CPT | Performed by: FAMILY MEDICINE

## 2020-08-28 NOTE — H&P
HPI:   Patient presents with:  Physical      Anastasia Kat is a 39year old female who presents for a complete physical exam without pap/gyne exam.     Patient has new complaints of:  Symptoms of UTI, now improving, she had bought new body wash which sh (300 mg total) into the vein every 30 (thirty) days. Dx: Multiple Sclerosis (G35) 15 mL 5   • PARoxetine HCl 20 MG Oral Tab Take 1 tablet (20 mg total) by mouth every morning.  90 tablet 0   • fluconazole 150 MG Oral Tab TK 1 T PO ONCE FOR 1 DOSE AND TK ANO Not Currently        Frequency: Never      Drug use: Not Currently    Other Topics      Concerns:          REVIEW OF SYSTEMS:   Pertinent positives and negatives noted in the the HPI.  Specifically:  GEN:  No fever or excessive fatigue, no unintentional renan old female who presents for a complete physical exam.  She is in good health. Her weight is stable, Estimated body mass index is 23.72 kg/m² as calculated from the following:    Height as of this encounter: 69\".     Weight as of this encounter: 160 lb 9.6 SCREENING BILAT (CPT=77067/64755)    Scott Wagoner MD

## 2020-08-29 ENCOUNTER — PATIENT MESSAGE (OUTPATIENT)
Dept: FAMILY MEDICINE CLINIC | Facility: CLINIC | Age: 45
End: 2020-08-29

## 2020-08-29 DIAGNOSIS — K21.9 GASTROESOPHAGEAL REFLUX DISEASE, ESOPHAGITIS PRESENCE NOT SPECIFIED: Primary | ICD-10-CM

## 2020-08-31 RX ORDER — PANTOPRAZOLE SODIUM 40 MG/1
40 TABLET, DELAYED RELEASE ORAL
Qty: 90 TABLET | Refills: 0 | Status: SHIPPED | OUTPATIENT
Start: 2020-08-31 | End: 2021-01-07 | Stop reason: ALTCHOICE

## 2020-08-31 NOTE — TELEPHONE ENCOUNTER
From: Valerie Aldridge  To: April Ceballos MD  Sent: 8/29/2020 3:36 PM CDT  Subject: Prescription Question    Can you put me in something comparable to Omeprazole. Medicaid will only cover 120 days and that is over. Thanks.

## 2020-09-16 RX ORDER — DOXYCYCLINE HYCLATE 50 MG/1
50 CAPSULE ORAL 2 TIMES DAILY
Qty: 180 CAPSULE | Refills: 0 | Status: SHIPPED | OUTPATIENT
Start: 2020-09-16 | End: 2020-12-15

## 2020-09-16 NOTE — TELEPHONE ENCOUNTER
A refill request was received for:  Requested Prescriptions     Pending Prescriptions Disp Refills   • Doxycycline Hyclate 50 MG Oral Cap 180 capsule 0     Sig: Take 1 capsule (50 mg total) by mouth 2 (two) times daily.      Last refill date: 6/16/20  Qty:

## 2020-09-22 ENCOUNTER — OFFICE VISIT (OUTPATIENT)
Dept: HEMATOLOGY/ONCOLOGY | Facility: HOSPITAL | Age: 45
End: 2020-09-22
Attending: Other
Payer: MEDICAID

## 2020-09-22 VITALS
OXYGEN SATURATION: 100 % | HEART RATE: 84 BPM | SYSTOLIC BLOOD PRESSURE: 112 MMHG | WEIGHT: 157.63 LBS | TEMPERATURE: 98 F | DIASTOLIC BLOOD PRESSURE: 79 MMHG | BODY MASS INDEX: 23 KG/M2 | RESPIRATION RATE: 16 BRPM

## 2020-09-22 DIAGNOSIS — G35 MULTIPLE SCLEROSIS (HCC): Primary | ICD-10-CM

## 2020-09-22 PROCEDURE — 96365 THER/PROPH/DIAG IV INF INIT: CPT

## 2020-09-22 NOTE — PROGRESS NOTES
Pt to infusion for monthly Tysabri for MS. Arrived ambulating with use of cane. Brace present right leg. Denies any recent changes in her condition. Chronic generalized pain. Takes Aleve at home for symptom relief.   PIV to left FA with good blood ret

## 2020-09-24 ENCOUNTER — OFFICE VISIT (OUTPATIENT)
Dept: NEUROLOGY | Facility: CLINIC | Age: 45
End: 2020-09-24
Payer: MEDICAID

## 2020-09-24 ENCOUNTER — LAB ENCOUNTER (OUTPATIENT)
Dept: LAB | Facility: HOSPITAL | Age: 45
End: 2020-09-24
Attending: FAMILY MEDICINE
Payer: MEDICAID

## 2020-09-24 VITALS
SYSTOLIC BLOOD PRESSURE: 120 MMHG | WEIGHT: 155 LBS | BODY MASS INDEX: 22.96 KG/M2 | HEIGHT: 69 IN | DIASTOLIC BLOOD PRESSURE: 70 MMHG

## 2020-09-24 DIAGNOSIS — R30.0 DYSURIA: ICD-10-CM

## 2020-09-24 DIAGNOSIS — G35 MS (MULTIPLE SCLEROSIS) (HCC): Primary | ICD-10-CM

## 2020-09-24 LAB
ALBUMIN SERPL-MCNC: 4.2 G/DL (ref 3.4–5)
ALBUMIN/GLOB SERPL: 1.4 {RATIO} (ref 1–2)
ALP LIVER SERPL-CCNC: 85 U/L
ALT SERPL-CCNC: 23 U/L
ANION GAP SERPL CALC-SCNC: 6 MMOL/L (ref 0–18)
AST SERPL-CCNC: 15 U/L (ref 15–37)
BACTERIA UR QL AUTO: NEGATIVE /HPF
BASOPHILS # BLD AUTO: 0.05 X10(3) UL (ref 0–0.2)
BASOPHILS NFR BLD AUTO: 0.8 %
BILIRUB SERPL-MCNC: 0.6 MG/DL (ref 0.1–2)
BILIRUB UR QL: NEGATIVE
BUN BLD-MCNC: 10 MG/DL (ref 7–18)
BUN/CREAT SERPL: 10.9 (ref 10–20)
CALCIUM BLD-MCNC: 9 MG/DL (ref 8.5–10.1)
CHLORIDE SERPL-SCNC: 110 MMOL/L (ref 98–112)
CHOLEST SMN-MCNC: 148 MG/DL (ref ?–200)
CLARITY UR: CLEAR
CO2 SERPL-SCNC: 25 MMOL/L (ref 21–32)
COLOR UR: YELLOW
CREAT BLD-MCNC: 0.92 MG/DL
DEPRECATED RDW RBC AUTO: 47.7 FL (ref 35.1–46.3)
EOSINOPHIL # BLD AUTO: 0.07 X10(3) UL (ref 0–0.7)
EOSINOPHIL NFR BLD AUTO: 1.1 %
ERYTHROCYTE [DISTWIDTH] IN BLOOD BY AUTOMATED COUNT: 14.9 % (ref 11–15)
GLOBULIN PLAS-MCNC: 3 G/DL (ref 2.8–4.4)
GLUCOSE BLD-MCNC: 90 MG/DL (ref 70–99)
GLUCOSE UR-MCNC: NEGATIVE MG/DL
HCT VFR BLD AUTO: 34.2 %
HDLC SERPL-MCNC: 42 MG/DL (ref 40–59)
HGB BLD-MCNC: 11.9 G/DL
IMM GRANULOCYTES # BLD AUTO: 0.04 X10(3) UL (ref 0–1)
IMM GRANULOCYTES NFR BLD: 0.6 %
KETONES UR-MCNC: NEGATIVE MG/DL
LDLC SERPL CALC-MCNC: 58 MG/DL (ref ?–100)
LYMPHOCYTES # BLD AUTO: 2.63 X10(3) UL (ref 1–4)
LYMPHOCYTES NFR BLD AUTO: 40.5 %
M PROTEIN MFR SERPL ELPH: 7.2 G/DL (ref 6.4–8.2)
MCH RBC QN AUTO: 30.6 PG (ref 26–34)
MCHC RBC AUTO-ENTMCNC: 34.8 G/DL (ref 31–37)
MCV RBC AUTO: 87.9 FL
MONOCYTES # BLD AUTO: 0.7 X10(3) UL (ref 0.1–1)
MONOCYTES NFR BLD AUTO: 10.8 %
NEUTROPHILS # BLD AUTO: 3.01 X10 (3) UL (ref 1.5–7.7)
NEUTROPHILS # BLD AUTO: 3.01 X10(3) UL (ref 1.5–7.7)
NEUTROPHILS NFR BLD AUTO: 46.2 %
NITRITE UR QL STRIP.AUTO: NEGATIVE
NONHDLC SERPL-MCNC: 106 MG/DL (ref ?–130)
OSMOLALITY SERPL CALC.SUM OF ELEC: 291 MOSM/KG (ref 275–295)
PATIENT FASTING Y/N/NP: YES
PATIENT FASTING Y/N/NP: YES
PH UR: 6 [PH] (ref 5–8)
PLATELET # BLD AUTO: 186 10(3)UL (ref 150–450)
POTASSIUM SERPL-SCNC: 3.8 MMOL/L (ref 3.5–5.1)
PROT UR-MCNC: NEGATIVE MG/DL
RBC # BLD AUTO: 3.89 X10(6)UL
RBC #/AREA URNS AUTO: <1 /HPF
SODIUM SERPL-SCNC: 141 MMOL/L (ref 136–145)
SP GR UR STRIP: 1.01 (ref 1–1.03)
TRIGL SERPL-MCNC: 241 MG/DL (ref 30–149)
UROBILINOGEN UR STRIP-ACNC: <2
VLDLC SERPL CALC-MCNC: 48 MG/DL (ref 0–30)
WBC # BLD AUTO: 6.5 X10(3) UL (ref 4–11)
WBC #/AREA URNS AUTO: 7 /HPF

## 2020-09-24 PROCEDURE — 87086 URINE CULTURE/COLONY COUNT: CPT

## 2020-09-24 PROCEDURE — 82306 VITAMIN D 25 HYDROXY: CPT | Performed by: FAMILY MEDICINE

## 2020-09-24 PROCEDURE — 80061 LIPID PANEL: CPT | Performed by: FAMILY MEDICINE

## 2020-09-24 PROCEDURE — 3008F BODY MASS INDEX DOCD: CPT | Performed by: OTHER

## 2020-09-24 PROCEDURE — 99214 OFFICE O/P EST MOD 30 MIN: CPT | Performed by: OTHER

## 2020-09-24 PROCEDURE — 84466 ASSAY OF TRANSFERRIN: CPT | Performed by: FAMILY MEDICINE

## 2020-09-24 PROCEDURE — 36415 COLL VENOUS BLD VENIPUNCTURE: CPT | Performed by: FAMILY MEDICINE

## 2020-09-24 PROCEDURE — 83540 ASSAY OF IRON: CPT | Performed by: FAMILY MEDICINE

## 2020-09-24 PROCEDURE — 85025 COMPLETE CBC W/AUTO DIFF WBC: CPT | Performed by: FAMILY MEDICINE

## 2020-09-24 PROCEDURE — 3074F SYST BP LT 130 MM HG: CPT | Performed by: OTHER

## 2020-09-24 PROCEDURE — 81001 URINALYSIS AUTO W/SCOPE: CPT

## 2020-09-24 PROCEDURE — 80053 COMPREHEN METABOLIC PANEL: CPT | Performed by: FAMILY MEDICINE

## 2020-09-24 PROCEDURE — 3078F DIAST BP <80 MM HG: CPT | Performed by: OTHER

## 2020-09-24 RX ORDER — OXYBUTYNIN CHLORIDE 15 MG/1
15 TABLET, EXTENDED RELEASE ORAL DAILY
Qty: 90 TABLET | Refills: 1 | Status: SHIPPED | OUTPATIENT
Start: 2020-09-24 | End: 2021-01-12

## 2020-09-24 RX ORDER — PAROXETINE HYDROCHLORIDE 20 MG/1
20 TABLET, FILM COATED ORAL EVERY MORNING
Qty: 90 TABLET | Refills: 0 | Status: SHIPPED | OUTPATIENT
Start: 2020-09-24 | End: 2021-02-11

## 2020-09-24 RX ORDER — TOPIRAMATE 25 MG/1
TABLET ORAL
Qty: 270 TABLET | Refills: 3 | Status: SHIPPED | OUTPATIENT
Start: 2020-09-24 | End: 2021-01-14

## 2020-09-24 RX ORDER — ZOLPIDEM TARTRATE 5 MG/1
5 TABLET ORAL NIGHTLY PRN
Qty: 90 TABLET | Refills: 0 | Status: SHIPPED | OUTPATIENT
Start: 2020-09-24 | End: 2020-11-23

## 2020-09-24 RX ORDER — DALFAMPRIDINE 10 MG/1
1 TABLET, FILM COATED, EXTENDED RELEASE ORAL 2 TIMES DAILY
Qty: 180 TABLET | Refills: 3 | Status: SHIPPED | OUTPATIENT
Start: 2020-09-24 | End: 2021-02-23

## 2020-09-24 NOTE — PROGRESS NOTES
Neurology follow-up visit     Referred By: Dr. Vasquez ref. provider found    Chief Complaint: Patient presents with:  Multiple Sclerosis: LOV 1/21/20. C/o increase pain to right hand, feels cold and tingling sensation. C/o falling 3 times in the last month. been using clonazepam.  It seems that her insurance are now in the beginning of 2019 and she had to switch to a new insurance and that is why she ended up in our clinic at that time. She was expressing desire to go back on Tysabri.   She reports that her J Used       Alcohol use Not Currently       Drug use: Unknown       Family History   Problem Relation Age of Onset   • Cancer Mother         esophagus          Current Outpatient Medications:   •  Doxycycline Hyclate 50 MG Oral Cap, Take 1 capsule (50 mg to with breakfast., Disp: , Rfl:   •  fluconazole 150 MG Oral Tab, TK 1 T PO ONCE FOR 1 DOSE AND TK ANOTHER T IN 2 DAYS PRN, Disp: 4 tablet, Rfl: 0    No Known Allergies    ROS:   As in HPI, the rest of the 14 system review was done and was negative      Phys clumsy    Gait:  Wheelchair-bound    Labs:    Lab Results   Component Value Date    TSH 1.370 01/22/2020     Lab Results   Component Value Date    HDL 53 04/09/2019    LDL 82 04/09/2019    TRIG 133 04/09/2019     Lab Results   Component Value Date    HGB 1

## 2020-09-25 LAB — 25(OH)D3 SERPL-MCNC: 48.8 NG/ML (ref 30–100)

## 2020-09-27 DIAGNOSIS — D50.8 OTHER IRON DEFICIENCY ANEMIA: Primary | ICD-10-CM

## 2020-09-27 DIAGNOSIS — E78.5 DYSLIPIDEMIA: ICD-10-CM

## 2020-09-27 DIAGNOSIS — E55.9 VITAMIN D DEFICIENCY: Primary | ICD-10-CM

## 2020-09-27 NOTE — PROGRESS NOTES
Hussein 90,    Below are the results of your recently performed labs/tests:    Your vitamin D level was normal.    Please: Continue your present medication. Recheck as below: In 6-12 months. Follow up:  Pending the results. With me then.     Take

## 2020-09-27 NOTE — PROGRESS NOTES
Hussein 90,    Below are the results of your recently performed labs/tests: All results are within NORMAL limits EXCEPT:    Your lipids: Triglycerides were mild to moderately elevated.   Please let me know whether you did fast for 12 hours prior t

## 2020-09-28 ENCOUNTER — IMMUNIZATION (OUTPATIENT)
Dept: FAMILY MEDICINE CLINIC | Facility: CLINIC | Age: 45
End: 2020-09-28
Payer: MEDICAID

## 2020-09-28 DIAGNOSIS — Z23 NEED FOR VACCINATION: ICD-10-CM

## 2020-09-29 PROCEDURE — 90686 IIV4 VACC NO PRSV 0.5 ML IM: CPT | Performed by: FAMILY MEDICINE

## 2020-09-29 PROCEDURE — 90471 IMMUNIZATION ADMIN: CPT | Performed by: FAMILY MEDICINE

## 2020-10-15 DIAGNOSIS — G35 MS (MULTIPLE SCLEROSIS) (HCC): ICD-10-CM

## 2020-10-15 RX ORDER — CLONAZEPAM 0.5 MG/1
0.5 TABLET ORAL DAILY
Qty: 90 TABLET | Refills: 1 | Status: SHIPPED | OUTPATIENT
Start: 2020-10-15 | End: 2021-03-29

## 2020-10-15 NOTE — TELEPHONE ENCOUNTER
Refill request for clonazepam 0.5 mg, take 1 tab daily, #90, 1 refill    LOV: 8/28/20  NOV: None  Last refilled on 9/17 for 30 day supply

## 2020-10-20 ENCOUNTER — OFFICE VISIT (OUTPATIENT)
Dept: HEMATOLOGY/ONCOLOGY | Facility: HOSPITAL | Age: 45
End: 2020-10-20
Attending: Other
Payer: MEDICAID

## 2020-10-20 VITALS
HEART RATE: 83 BPM | RESPIRATION RATE: 16 BRPM | TEMPERATURE: 98 F | SYSTOLIC BLOOD PRESSURE: 109 MMHG | OXYGEN SATURATION: 100 % | DIASTOLIC BLOOD PRESSURE: 71 MMHG | WEIGHT: 159 LBS | BODY MASS INDEX: 23 KG/M2

## 2020-10-20 DIAGNOSIS — G35 MULTIPLE SCLEROSIS (HCC): Primary | ICD-10-CM

## 2020-10-20 PROCEDURE — 96365 THER/PROPH/DIAG IV INF INIT: CPT

## 2020-10-20 NOTE — PROGRESS NOTES
Pt to infusion for monthly Tysabri for MS. Arrived ambulating with use of walker. Brace present right leg. Denies any recent changes in her condition. Chronic generalized pain has not changed. Takes Aleve at home for symptom relief.   PIV to left FA w FREE:[LAST:[[facility]],PHONE:[(   )    -],FAX:[(   )    -],ADDRESS:[Address: [address] [place] [address]  [phone]]],FREE:[LAST:[[facility]PHONE:[(   )    -],FAX:[(   )    -],ADDRESS:[Address[address] [place] [address]  Phone: [phone]]]

## 2020-10-24 ENCOUNTER — APPOINTMENT (OUTPATIENT)
Dept: GENERAL RADIOLOGY | Age: 45
End: 2020-10-24
Attending: STUDENT IN AN ORGANIZED HEALTH CARE EDUCATION/TRAINING PROGRAM

## 2020-10-24 ENCOUNTER — APPOINTMENT (OUTPATIENT)
Dept: CT IMAGING | Age: 45
End: 2020-10-24
Attending: STUDENT IN AN ORGANIZED HEALTH CARE EDUCATION/TRAINING PROGRAM

## 2020-10-24 ENCOUNTER — HOSPITAL ENCOUNTER (EMERGENCY)
Age: 45
Discharge: HOME | End: 2020-10-24
Attending: EMERGENCY MEDICINE

## 2020-10-24 VITALS
RESPIRATION RATE: 17 BRPM | OXYGEN SATURATION: 100 % | HEART RATE: 108 BPM | DIASTOLIC BLOOD PRESSURE: 65 MMHG | SYSTOLIC BLOOD PRESSURE: 130 MMHG
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
## 2020-10-24 DIAGNOSIS — G44.319 ACUTE POST-TRAUMATIC HEADACHE, NOT INTRACTABLE: ICD-10-CM

## 2020-10-24 PROCEDURE — 73030 X-RAY EXAM OF SHOULDER: CPT

## 2020-10-24 PROCEDURE — 72125 CT NECK SPINE W/O DYE: CPT

## 2020-10-24 PROCEDURE — 73080 X-RAY EXAM OF ELBOW: CPT

## 2020-10-24 PROCEDURE — 99284 EMERGENCY DEPT VISIT MOD MDM: CPT

## 2020-10-24 PROCEDURE — 73502 X-RAY EXAM HIP UNI 2-3 VIEWS: CPT

## 2020-10-24 PROCEDURE — 99285 EMERGENCY DEPT VISIT HI MDM: CPT | Performed by: EMERGENCY MEDICINE

## 2020-10-24 PROCEDURE — 70450 CT HEAD/BRAIN W/O DYE: CPT

## 2020-10-24 RX ORDER — ACETAMINOPHEN 500 MG
1000 TABLET ORAL ONCE
Status: DISCONTINUED | OUTPATIENT
Start: 2020-10-24 | End: 2020-10-24 | Stop reason: HOSPADM

## 2020-10-24 ASSESSMENT — ENCOUNTER SYMPTOMS
ABDOMINAL PAIN: 0
NAUSEA: 0
FEVER: 0
SORE THROAT: 0
VOMITING: 0
SHORTNESS OF BREATH: 0
DIAPHORESIS: 0
HEADACHES: 1
DIARRHEA: 0

## 2020-10-24 ASSESSMENT — PAIN DESCRIPTION - PAIN TYPE: TYPE: ACUTE PAIN

## 2020-10-24 ASSESSMENT — PAIN SCALES - GENERAL: PAINLEVEL_OUTOF10: 8

## 2020-10-26 ENCOUNTER — TELEPHONE (OUTPATIENT)
Dept: NEUROLOGY | Facility: CLINIC | Age: 45
End: 2020-10-26

## 2020-10-26 DIAGNOSIS — G35 MS (MULTIPLE SCLEROSIS) (HCC): Primary | ICD-10-CM

## 2020-10-26 NOTE — TELEPHONE ENCOUNTER
I will place orders for physical therapy. She can take it to any agency that she wants.       If she does not have anything in particular in mind and we will can order it for the [address]

## 2020-11-02 ENCOUNTER — PATIENT MESSAGE (OUTPATIENT)
Dept: FAMILY MEDICINE CLINIC | Facility: CLINIC | Age: 45
End: 2020-11-02

## 2020-11-03 NOTE — TELEPHONE ENCOUNTER
Spoke to pt, she denied scheduling a visit. Pt stated this was as an FYI to let Dr. Sander Sheikh know. Her 1 bad day was Thursday last week and she has been getting better each day and resting.  Pt is aware to call our office/send a message with any questions or c

## 2020-11-10 ENCOUNTER — PATIENT MESSAGE (OUTPATIENT)
Dept: NEUROLOGY | Facility: CLINIC | Age: 45
End: 2020-11-10

## 2020-11-10 ENCOUNTER — PATIENT MESSAGE (OUTPATIENT)
Dept: FAMILY MEDICINE CLINIC | Facility: CLINIC | Age: 45
End: 2020-11-10

## 2020-11-10 NOTE — TELEPHONE ENCOUNTER
Spoke with Sam Wills and informed her of the instructions from Damián Bird.   Appointment confirmed for 11- at 2 pm

## 2020-11-10 NOTE — TELEPHONE ENCOUNTER
Spoke with [facility] from [facility]. She spoke with her supervisor Jacki Gomez who stated that the infusion must be two weeks post testing + for Covid, the patient must be symptom free for one week and must contact their PCP prior to the procedure.       Infu

## 2020-11-10 NOTE — TELEPHONE ENCOUNTER
From: Teresa Ballesteros  To: Thea Taylor MD  Sent: 11/10/2020 2:27 PM CST  Subject: Non-Urgent Medical Question    I tested positive for covid-19 on October 31. I am fine now. I have my infusion next Tuesday.  Can you call them and clear me so I can g

## 2020-11-10 NOTE — TELEPHONE ENCOUNTER
Spoke with Madeleine Ventura from [employer] [phone] and informed him of the message from Alley Colby.   He stated to inform patient that she needs to contact her PCP to inform them of her + Covid results, that she will be screened prior to her infusion and

## 2020-11-17 ENCOUNTER — OFFICE VISIT (OUTPATIENT)
Dept: HEMATOLOGY/ONCOLOGY | Facility: HOSPITAL | Age: 45
End: 2020-11-17
Attending: FAMILY MEDICINE
Payer: MEDICAID

## 2020-11-17 VITALS
OXYGEN SATURATION: 100 % | HEART RATE: 92 BPM | DIASTOLIC BLOOD PRESSURE: 70 MMHG | TEMPERATURE: 98 F | SYSTOLIC BLOOD PRESSURE: 110 MMHG | RESPIRATION RATE: 16 BRPM

## 2020-11-17 DIAGNOSIS — G35 MULTIPLE SCLEROSIS (HCC): Primary | ICD-10-CM

## 2020-11-17 PROCEDURE — 96365 THER/PROPH/DIAG IV INF INIT: CPT

## 2020-11-17 NOTE — PROGRESS NOTES
Mendoza Rodriguez presents for tysabri infusion ordered for ms. No changes in health status/history. Touch program completed, appropriate for tx today. PIV established with brisk blood return noted.   Tysabri infused over one hour with no s/s of adverse reaction note

## 2020-11-23 ENCOUNTER — PATIENT MESSAGE (OUTPATIENT)
Dept: NEUROLOGY | Facility: CLINIC | Age: 45
End: 2020-11-23

## 2020-11-23 DIAGNOSIS — G35 MS (MULTIPLE SCLEROSIS) (HCC): Primary | ICD-10-CM

## 2020-11-23 RX ORDER — TRAZODONE HYDROCHLORIDE 50 MG/1
50 TABLET ORAL NIGHTLY
Qty: 90 TABLET | Refills: 3 | Status: SHIPPED | OUTPATIENT
Start: 2020-11-23 | End: 2021-01-12

## 2020-11-23 NOTE — TELEPHONE ENCOUNTER
From: Ifeoma Mendes  To: Jaiden Caballero MD  Sent: 11/23/2020 7:05 AM CST  Subject: Non-Urgent Medical Question    Any word on PT. Can I have a new sleep pill. Ambien not working at all Maybe muscle relaxer. Thanks.

## 2020-11-23 NOTE — TELEPHONE ENCOUNTER
Karoline Hoang in intake at [facility] called to say the cannot service this patient due to location and staffing.

## 2020-11-24 ENCOUNTER — TELEPHONE (OUTPATIENT)
Dept: NEUROLOGY | Facility: CLINIC | Age: 45
End: 2020-11-24

## 2020-11-24 NOTE — TELEPHONE ENCOUNTER
This message was sent to [facility] via [employer]: This is Seven Hardin from Dr. Harvinder Roman office. We just received a phone call from [facility] that due to Covid 19, they are unable to accept you as a patient right now.   We recommend that you

## 2020-11-24 NOTE — TELEPHONE ENCOUNTER
Regarding: RE: Non-Urgent Medical Question  Contact: [phone]  ----- Message from Joseph Baum sent at 11/24/2020  3:07 PM CST -----       ----- Message sent from Nae Christianson [address] [place] [address] Davon to Sandy Hero at 11/23/2020  1:20 PM -----   Dr. Asif Becerra

## 2020-12-01 ENCOUNTER — PATIENT MESSAGE (OUTPATIENT)
Dept: NEUROLOGY | Facility: CLINIC | Age: 45
End: 2020-12-01

## 2020-12-01 RX ORDER — TEMAZEPAM 7.5 MG/1
7.5 CAPSULE ORAL NIGHTLY PRN
Qty: 90 CAPSULE | Refills: 3 | Status: SHIPPED | OUTPATIENT
Start: 2020-12-01 | End: 2021-01-12

## 2020-12-01 NOTE — TELEPHONE ENCOUNTER
Lets try temazepam. Lets stop trazodone.   If this won't work, maybe we will need to get opinion from sleep specialist.

## 2020-12-01 NOTE — TELEPHONE ENCOUNTER
From: Adrianne Cole  To: Ever Salhe MD  Sent: 12/1/2020 11:48 AM CST  Subject: Non-Urgent Medical Question    Trazodone not working. Takes 4 hrs to fall asleep.  Can I try 2????

## 2020-12-07 ENCOUNTER — TELEPHONE (OUTPATIENT)
Dept: NEUROLOGY | Facility: CLINIC | Age: 45
End: 2020-12-07

## 2020-12-07 NOTE — TELEPHONE ENCOUNTER
Received tysabri reauthorization questionnaire form. Patient last seen on  9/24/20. She has continued with Tysabri infusions. Form filled out. Placed on Dr. Meme Moreno desk for signature.

## 2020-12-09 ENCOUNTER — LAB ENCOUNTER (OUTPATIENT)
Dept: LAB | Facility: REFERENCE LAB | Age: 45
End: 2020-12-09
Attending: FAMILY MEDICINE
Payer: MEDICAID

## 2020-12-09 DIAGNOSIS — G35 MS (MULTIPLE SCLEROSIS) (HCC): ICD-10-CM

## 2020-12-09 PROCEDURE — 80061 LIPID PANEL: CPT | Performed by: FAMILY MEDICINE

## 2020-12-09 PROCEDURE — 84466 ASSAY OF TRANSFERRIN: CPT | Performed by: FAMILY MEDICINE

## 2020-12-09 PROCEDURE — 82306 VITAMIN D 25 HYDROXY: CPT | Performed by: FAMILY MEDICINE

## 2020-12-09 PROCEDURE — 87798 DETECT AGENT NOS DNA AMP: CPT

## 2020-12-09 PROCEDURE — 83540 ASSAY OF IRON: CPT | Performed by: FAMILY MEDICINE

## 2020-12-09 PROCEDURE — 36415 COLL VENOUS BLD VENIPUNCTURE: CPT | Performed by: FAMILY MEDICINE

## 2020-12-09 PROCEDURE — 85025 COMPLETE CBC W/AUTO DIFF WBC: CPT | Performed by: FAMILY MEDICINE

## 2020-12-12 DIAGNOSIS — E55.9 VITAMIN D DEFICIENCY: Primary | ICD-10-CM

## 2020-12-12 DIAGNOSIS — D50.8 OTHER IRON DEFICIENCY ANEMIA: Primary | ICD-10-CM

## 2020-12-13 ENCOUNTER — TELEPHONE (OUTPATIENT)
Dept: FAMILY MEDICINE CLINIC | Facility: CLINIC | Age: 45
End: 2020-12-13

## 2020-12-13 RX ORDER — FLUCONAZOLE 150 MG/1
TABLET ORAL
Qty: 2 TABLET | Refills: 0 | Status: SHIPPED | OUTPATIENT
Start: 2020-12-13 | End: 2021-01-12

## 2020-12-13 RX ORDER — NITROFURANTOIN 25; 75 MG/1; MG/1
100 CAPSULE ORAL 2 TIMES DAILY
Qty: 14 CAPSULE | Refills: 0 | Status: SHIPPED | OUTPATIENT
Start: 2020-12-13 | End: 2021-01-12

## 2020-12-13 NOTE — PROGRESS NOTES
Hussein 90,    Below are the results of your recently performed labs/tests: All results are within NORMAL limits. Please: Continue your present medication(s)/supplements. Recheck as below: In 6 months to one year.   Follow up:  Pending the re

## 2020-12-13 NOTE — PROGRESS NOTES
Hussein 90,    Below are the results of your recently performed labs/tests: All results are within NORMAL limits EXCEPT:    Your lipids: Triglycerides were still mildly elevated however improved since your last check.   I recommend watching your d

## 2020-12-14 ENCOUNTER — TELEPHONE (OUTPATIENT)
Dept: NEUROLOGY | Facility: CLINIC | Age: 45
End: 2020-12-14

## 2020-12-14 NOTE — TELEPHONE ENCOUNTER
A refill request was received for:  Requested Prescriptions     Pending Prescriptions Disp Refills   • Doxycycline Hyclate 50 MG Oral Cap 180 capsule 0     Sig: Take 1 capsule (50 mg total) by mouth 2 (two) times daily.      Last refill date: 09/16/2020  Qt

## 2020-12-14 NOTE — TELEPHONE ENCOUNTER
Was paged regarding UTI. Sx started today and include dysuria, urinary frequency & urgency. No hematuria, fevers, or back/flank pain. Has hx of frequent UTIs with subsequent yeast infections thereafter.  Will treat with macrobid and give diflucan to use prn

## 2020-12-15 ENCOUNTER — OFFICE VISIT (OUTPATIENT)
Dept: HEMATOLOGY/ONCOLOGY | Facility: HOSPITAL | Age: 45
End: 2020-12-15
Attending: Other
Payer: MEDICAID

## 2020-12-15 ENCOUNTER — TELEPHONE (OUTPATIENT)
Dept: NEUROLOGY | Facility: CLINIC | Age: 45
End: 2020-12-15

## 2020-12-15 ENCOUNTER — PATIENT MESSAGE (OUTPATIENT)
Dept: NEUROLOGY | Facility: CLINIC | Age: 45
End: 2020-12-15

## 2020-12-15 VITALS
BODY MASS INDEX: 24 KG/M2 | WEIGHT: 161 LBS | DIASTOLIC BLOOD PRESSURE: 63 MMHG | RESPIRATION RATE: 16 BRPM | TEMPERATURE: 98 F | OXYGEN SATURATION: 100 % | SYSTOLIC BLOOD PRESSURE: 114 MMHG | HEART RATE: 85 BPM

## 2020-12-15 DIAGNOSIS — G35 MULTIPLE SCLEROSIS (HCC): Primary | ICD-10-CM

## 2020-12-15 PROCEDURE — 96365 THER/PROPH/DIAG IV INF INIT: CPT

## 2020-12-15 RX ORDER — DOXYCYCLINE HYCLATE 50 MG/1
50 CAPSULE ORAL 2 TIMES DAILY
Qty: 180 CAPSULE | Refills: 0 | Status: SHIPPED | OUTPATIENT
Start: 2020-12-15 | End: 2021-03-15

## 2020-12-15 NOTE — PROGRESS NOTES
Gail Travis presents for tysabri infusion ordered for MS. Denies any changes in her MS. She ambulates with use of walker and brace on right knee. States she was having urine frequency last week and has a history of UTI's.  She started antibiotics 3 days ago an

## 2020-12-16 NOTE — TELEPHONE ENCOUNTER
From: Ebonie Du  To: Barb West MD  Sent: 12/15/2020 7:24 PM CST  Subject: Prescription Question    Now I need a prior authorization on my clonazepam because I'm now on temazepam. I only have 2 clonazepam pill left. Sorry.

## 2020-12-17 ENCOUNTER — TELEPHONE (OUTPATIENT)
Dept: NEUROLOGY | Facility: CLINIC | Age: 45
End: 2020-12-17

## 2020-12-18 ENCOUNTER — TELEPHONE (OUTPATIENT)
Dept: NEUROLOGY | Facility: CLINIC | Age: 45
End: 2020-12-18

## 2020-12-21 ENCOUNTER — PATIENT MESSAGE (OUTPATIENT)
Dept: NEUROLOGY | Facility: CLINIC | Age: 45
End: 2020-12-21

## 2020-12-21 NOTE — TELEPHONE ENCOUNTER
Message sent to Dr Miguel Angel Bravo to confirm if patient should stay on clonazepam when also on temazepam.

## 2020-12-21 NOTE — TELEPHONE ENCOUNTER
----- Message from [facility] sent at 12/21/2020 12:09 PM CST -----  Regarding: Prescription Question  Contact: [phone]  Any word on clonazepam

## 2020-12-22 NOTE — TELEPHONE ENCOUNTER
Spoke with patient who is asking for a \"new muscle relaxer\". Informed patient that she is on Clonazepam and Temazepam and that message will be forwarded to MD.  Patient verbalized understanding.

## 2020-12-22 NOTE — TELEPHONE ENCOUNTER
Spoke to patient. She is taking temazepam 7.5 mg nightly which is not helping with her sleep. She would prefer to stay on clonazepam and get off temazepam as she feels clonazepam has really helped with muscle spasms, especially in her legs.  She will discon

## 2020-12-23 RX ORDER — CYCLOBENZAPRINE HCL 10 MG
10 TABLET ORAL 3 TIMES DAILY PRN
Qty: 90 TABLET | Refills: 5 | Status: SHIPPED | OUTPATIENT
Start: 2020-12-23 | End: 2021-01-12

## 2021-01-05 ENCOUNTER — MED REC SCAN ONLY (OUTPATIENT)
Dept: NEUROLOGY | Facility: CLINIC | Age: 46
End: 2021-01-05

## 2021-01-06 DIAGNOSIS — K21.9 GASTROESOPHAGEAL REFLUX DISEASE, UNSPECIFIED WHETHER ESOPHAGITIS PRESENT: Primary | ICD-10-CM

## 2021-01-06 NOTE — TELEPHONE ENCOUNTER
Received a fax from [address] [place] [address] for pt's medical supplies. Spoke to pt, she has an appt scheduled for 1/12/2021; hold forms until this appt to send in updated office visit notes.      Patient also requests a refill for:    A refill request was received f

## 2021-01-07 RX ORDER — OMEPRAZOLE 20 MG/1
20 CAPSULE, DELAYED RELEASE ORAL
Qty: 90 CAPSULE | Refills: 0 | Status: SHIPPED | OUTPATIENT
Start: 2021-01-07 | End: 2021-09-30

## 2021-01-12 ENCOUNTER — OFFICE VISIT (OUTPATIENT)
Dept: HEMATOLOGY/ONCOLOGY | Facility: HOSPITAL | Age: 46
End: 2021-01-12
Attending: Other
Payer: MEDICAID

## 2021-01-12 ENCOUNTER — OFFICE VISIT (OUTPATIENT)
Dept: FAMILY MEDICINE CLINIC | Facility: CLINIC | Age: 46
End: 2021-01-12
Payer: MEDICAID

## 2021-01-12 VITALS
WEIGHT: 160.81 LBS | HEIGHT: 67.99 IN | TEMPERATURE: 97 F | DIASTOLIC BLOOD PRESSURE: 75 MMHG | OXYGEN SATURATION: 100 % | BODY MASS INDEX: 24.37 KG/M2 | HEART RATE: 85 BPM | RESPIRATION RATE: 16 BRPM | SYSTOLIC BLOOD PRESSURE: 116 MMHG

## 2021-01-12 VITALS
OXYGEN SATURATION: 99 % | WEIGHT: 160.81 LBS | DIASTOLIC BLOOD PRESSURE: 76 MMHG | SYSTOLIC BLOOD PRESSURE: 112 MMHG | HEART RATE: 95 BPM | BODY MASS INDEX: 24 KG/M2

## 2021-01-12 DIAGNOSIS — M25.50 ARTHRALGIA, UNSPECIFIED JOINT: ICD-10-CM

## 2021-01-12 DIAGNOSIS — G35 MULTIPLE SCLEROSIS (HCC): ICD-10-CM

## 2021-01-12 DIAGNOSIS — R30.0 DYSURIA: ICD-10-CM

## 2021-01-12 DIAGNOSIS — G35 MULTIPLE SCLEROSIS (HCC): Primary | ICD-10-CM

## 2021-01-12 DIAGNOSIS — R32 URINARY INCONTINENCE IN FEMALE: Primary | ICD-10-CM

## 2021-01-12 DIAGNOSIS — K59.00 CONSTIPATION, UNSPECIFIED CONSTIPATION TYPE: ICD-10-CM

## 2021-01-12 DIAGNOSIS — Z86.16 HISTORY OF 2019 NOVEL CORONAVIRUS DISEASE (COVID-19): ICD-10-CM

## 2021-01-12 DIAGNOSIS — Z87.440 HISTORY OF RECURRENT URINARY TRACT INFECTION: ICD-10-CM

## 2021-01-12 LAB
APPEARANCE: CLEAR
BILIRUBIN: NEGATIVE
GLUCOSE (URINE DIPSTICK): NEGATIVE MG/DL
KETONES (URINE DIPSTICK): NEGATIVE MG/DL
LEUKOCYTES: NEGATIVE
MULTISTIX LOT#: 1044 NUMERIC
NITRITE, URINE: NEGATIVE
OCCULT BLOOD: NEGATIVE
PH, URINE: 6 (ref 4.5–8)
PROTEIN (URINE DIPSTICK): NEGATIVE MG/DL
SPECIFIC GRAVITY: <1.005 (ref 1–1.03)
URINE-COLOR: YELLOW
UROBILINOGEN,SEMI-QN: 0.2 MG/DL (ref 0–1.9)

## 2021-01-12 PROCEDURE — 3074F SYST BP LT 130 MM HG: CPT | Performed by: FAMILY MEDICINE

## 2021-01-12 PROCEDURE — 81003 URINALYSIS AUTO W/O SCOPE: CPT | Performed by: FAMILY MEDICINE

## 2021-01-12 PROCEDURE — 3078F DIAST BP <80 MM HG: CPT | Performed by: FAMILY MEDICINE

## 2021-01-12 PROCEDURE — 99214 OFFICE O/P EST MOD 30 MIN: CPT | Performed by: FAMILY MEDICINE

## 2021-01-12 PROCEDURE — 96365 THER/PROPH/DIAG IV INF INIT: CPT

## 2021-01-12 RX ORDER — DIPHENHYDRAMINE HCL 25 MG
1 TABLET,DISINTEGRATING ORAL 2 TIMES DAILY
Qty: 180 TABLET | Refills: 2 | Status: SHIPPED | OUTPATIENT
Start: 2021-01-12 | End: 2021-02-11

## 2021-01-12 RX ORDER — FERROUS SULFATE 325(65) MG
325 TABLET ORAL
Qty: 90 TABLET | Refills: 2 | Status: SHIPPED | OUTPATIENT
Start: 2021-01-12

## 2021-01-12 RX ORDER — TOLTERODINE 4 MG/1
4 CAPSULE, EXTENDED RELEASE ORAL DAILY
Qty: 90 CAPSULE | Refills: 0 | Status: SHIPPED | OUTPATIENT
Start: 2021-01-12 | End: 2021-02-10

## 2021-01-12 RX ORDER — MIRABEGRON 25 MG/1
25 TABLET, FILM COATED, EXTENDED RELEASE ORAL DAILY
Qty: 30 TABLET | Refills: 3 | Status: SHIPPED | OUTPATIENT
Start: 2021-01-12 | End: 2021-01-12

## 2021-01-12 RX ORDER — NITROFURANTOIN 25; 75 MG/1; MG/1
100 CAPSULE ORAL 2 TIMES DAILY
Qty: 20 CAPSULE | Refills: 1 | Status: SHIPPED | OUTPATIENT
Start: 2021-01-12 | End: 2021-06-08

## 2021-01-12 NOTE — PROGRESS NOTES
Pt to infusion for monthly Tysabri for MS. Arrived ambulating with use of rolling walker. Denies any recent changes in her condition. Online Touch Program checklist completed. PIV to left FA x 1 with good blood return. Infusion given over 1 hour .

## 2021-01-12 NOTE — PROGRESS NOTES
HPI:   Patient presents with:  UTI: reaccurring uti   Multiple Sclerosis  Incontinence  Musculoskeletal Problem      Jennie Tadeo is a 39year old female who presents for follow-up for UTIs    Patient without new complaints except for chronic urinary i 25 MG Oral Tab 3 pills qhs 270 tablet 3   • natalizumab 300 MG/15ML Intravenous Conc Inject 15 mL (300 mg total) into the vein every 30 (thirty) days.  Dx: Multiple Sclerosis (G35) 15 mL 5   • PARoxetine HCl 20 MG Oral Tab Take 1 tablet (20 mg total) by stephanie auscultation B, no wheezing and no rhonchi B   CARDIO: RRR without murmur, NL S1 S2, no S3 S4   EXT: no edema  GI: NABS, soft, nodistended, no masses, no HSM; no suprapubic tenderness  PSYCH: mood and affect WNL  NEURO and MS: no change    ASSESSMENT AND P Future  - SED RATE, LIGIA (AUTOMATED)    6. History of 2019 novel coronavirus disease (COVID-19)  Now resolved    7. Constipation, unspecified constipation type  See above  - bisacodyl 5 MG Oral Tab EC;  Take 1 tablet (5 mg total) by mouth 2 (two) time

## 2021-01-14 ENCOUNTER — OFFICE VISIT (OUTPATIENT)
Dept: NEUROLOGY | Facility: CLINIC | Age: 46
End: 2021-01-14
Payer: MEDICAID

## 2021-01-14 ENCOUNTER — TELEPHONE (OUTPATIENT)
Dept: FAMILY MEDICINE CLINIC | Facility: CLINIC | Age: 46
End: 2021-01-14

## 2021-01-14 ENCOUNTER — LAB ENCOUNTER (OUTPATIENT)
Dept: LAB | Facility: HOSPITAL | Age: 46
End: 2021-01-14
Attending: Other
Payer: MEDICAID

## 2021-01-14 DIAGNOSIS — G35 MS (MULTIPLE SCLEROSIS) (HCC): Primary | ICD-10-CM

## 2021-01-14 PROCEDURE — 36415 COLL VENOUS BLD VENIPUNCTURE: CPT | Performed by: OTHER

## 2021-01-14 PROCEDURE — 99214 OFFICE O/P EST MOD 30 MIN: CPT | Performed by: OTHER

## 2021-01-14 PROCEDURE — 86711 JOHN CUNNINGHAM ANTIBODY: CPT | Performed by: OTHER

## 2021-01-14 RX ORDER — TOPIRAMATE 25 MG/1
TABLET ORAL
Qty: 270 TABLET | Refills: 3 | Status: SHIPPED | OUTPATIENT
Start: 2021-01-14 | End: 2021-10-27

## 2021-01-14 RX ORDER — ZOLPIDEM TARTRATE 10 MG/1
10 TABLET ORAL NIGHTLY PRN
COMMUNITY
End: 2021-10-27

## 2021-01-14 NOTE — PROGRESS NOTES
Neurology follow-up visit     Referred By: Dr. Vasquez ref. provider found    Chief Complaint: Patient presents with:  Multiple Sclerosis: LOV: 9/24/20. F/U on MS. Patient states that she is doing okay. She admits she is declining.  She went back on clonazepam an orthopedic doctor but she did not follow-up with them due to excessive amount of tests ordered. She has been using Advil.   She has been using clonazepam.  It seems that her insurance are now in the beginning of 2019 and she had to switch to a new insur done.  In the meantime patient also has significant problems with insomnia, therefore there was some changes but eventually patient ended up being back on Ambien and clonazepam.    Worsening of gait, more frequent falling.   Some increasing generalized pain Sclerosis (G35), Disp: 15 mL, Rfl: 5  •  PARoxetine HCl 20 MG Oral Tab, Take 1 tablet (20 mg total) by mouth every morning., Disp: 90 tablet, Rfl: 0  •  AMPYRA 10 MG Oral Tablet 12 Hr, Take 1 tablet (10 mg total) by mouth 2 (two) times daily. , Disp: 180 ta midline    Motor Exam:  Muscle tone increased in the right arm and leg  No atrophy or fasciculations  Strength- upper extremities 5-/5 proximally and distally on the left, and 4- out of 5 in the right                  - lower  extremities 4-/5 proximally a given. All questions were answered. All side effects of drugs were discussed. Return to clinic in: No follow-ups on file.     Aleida Moreno MD

## 2021-01-22 ENCOUNTER — OFFICE VISIT (OUTPATIENT)
Dept: FAMILY MEDICINE CLINIC | Facility: CLINIC | Age: 46
End: 2021-01-22
Payer: MEDICAID

## 2021-01-22 ENCOUNTER — PATIENT MESSAGE (OUTPATIENT)
Dept: NEUROLOGY | Facility: CLINIC | Age: 46
End: 2021-01-22

## 2021-01-22 VITALS
HEART RATE: 81 BPM | OXYGEN SATURATION: 97 % | HEIGHT: 67.99 IN | DIASTOLIC BLOOD PRESSURE: 62 MMHG | SYSTOLIC BLOOD PRESSURE: 100 MMHG | BODY MASS INDEX: 24.25 KG/M2 | WEIGHT: 160 LBS

## 2021-01-22 DIAGNOSIS — N28.1 CYST OF RIGHT KIDNEY: ICD-10-CM

## 2021-01-22 DIAGNOSIS — N83.209 CYST OF OVARY, UNSPECIFIED LATERALITY: ICD-10-CM

## 2021-01-22 DIAGNOSIS — N93.9 ABNORMAL VAGINAL BLEEDING: ICD-10-CM

## 2021-01-22 DIAGNOSIS — R10.30 LOWER ABDOMINAL PAIN: Primary | ICD-10-CM

## 2021-01-22 PROCEDURE — 3008F BODY MASS INDEX DOCD: CPT | Performed by: FAMILY MEDICINE

## 2021-01-22 PROCEDURE — 99214 OFFICE O/P EST MOD 30 MIN: CPT | Performed by: FAMILY MEDICINE

## 2021-01-22 PROCEDURE — 3078F DIAST BP <80 MM HG: CPT | Performed by: FAMILY MEDICINE

## 2021-01-22 PROCEDURE — 3074F SYST BP LT 130 MM HG: CPT | Performed by: FAMILY MEDICINE

## 2021-01-22 NOTE — TELEPHONE ENCOUNTER
From: Fatou Ferguson  To: Damaris Talbot MD  Sent: 1/22/2021 9:24 AM CST  Subject: Non-Urgent Medical Question    Hi at home physical therapy hasn't received referral yet. Please fax to [phone]. Thanks.

## 2021-01-25 NOTE — PROGRESS NOTES
HPI:   Patient presents with: Follow - Up: lower abdominal pains.  was [facility]  Kidney Problem: cyst on kidney, believes it burst yesterday  Gyn Problem: OVARIAN CYST, abnl vag bleeding      Faith Dickson is a 39year old female who presents for fol Nitrofurantoin Monohyd Macro 100 MG Oral Cap Take 1 capsule (100 mg total) by mouth 2 (two) times daily.  X 7-10 days prn 20 capsule 1   • Ferrous Sulfate 325 (65 Fe) MG Oral Tab Take 1 tablet (325 mg total) by mouth daily with breakfast. 90 tablet 2   • Ca wheeze  GI:  See above  :  No dysuria  MS:  No new joint pain or swelling B  NEURO:  Denies new numbness or tingling or weakness  PSYCH:  No mood concerns     EXAM:   /62   Pulse 81   Ht 5' 7.99\" (1.727 m)   Wt 160 lb (72.6 kg)   LMP 12/29/2020 Future    4. Cyst of ovary, unspecified laterality  CT in the ER with follicular cysts, ? Evidence of ruptured ovarian cysts, see above recommendations      The patient indicates understanding of the above recommendations and agrees to the above plan.   Leroy Viveros

## 2021-01-28 ENCOUNTER — TELEPHONE (OUTPATIENT)
Dept: NEUROLOGY | Facility: CLINIC | Age: 46
End: 2021-01-28

## 2021-01-28 NOTE — TELEPHONE ENCOUNTER
Called [facility], spoke to pharmacist. No interaction between topiramate and Ampyra. Patient get Ampyra from specialty pharmacy. HE states that patient was prescribed fluconazole by PCP but this was cleared up already.  No questions for this office at this

## 2021-01-28 NOTE — TELEPHONE ENCOUNTER
----- Message from Nasreen Hamilton MD sent at 1/28/2021  7:39 AM CST -----  Please let the patient know that she did not have BECCA virus antibodies. Therefore risk of PML significantly less.   We can continue with our plan for treatment: Tysabri    Than

## 2021-02-09 ENCOUNTER — APPOINTMENT (OUTPATIENT)
Dept: HEMATOLOGY/ONCOLOGY | Facility: HOSPITAL | Age: 46
End: 2021-02-09
Attending: Other
Payer: MEDICAID

## 2021-02-10 ENCOUNTER — TELEPHONE (OUTPATIENT)
Dept: NEUROLOGY | Facility: CLINIC | Age: 46
End: 2021-02-10

## 2021-02-10 DIAGNOSIS — G35 MS (MULTIPLE SCLEROSIS) (HCC): Primary | ICD-10-CM

## 2021-02-10 NOTE — TELEPHONE ENCOUNTER
paperwork to be completed requested by patient  From the state of [place] for a home services program. Self address envelope with stamp enclosed.    Placed in RN folder

## 2021-02-10 NOTE — TELEPHONE ENCOUNTER
Refill request for PARoxetine HCl 20 MG Oral Tab, Take 1 tablet (20 mg total) by mouth every morning., 90 Tablets with 0 Refills    LOV: 1/14/21  NOV: NONE    Last Refilled: 11/17/20

## 2021-02-10 NOTE — TELEPHONE ENCOUNTER
Received forms from Department of Human Services for in home care assistance as an alternative to nursing facility placement. Form started and reviewed with Dr. Jagruti Vazquez. Completed forms mailed to Department of Human Services in stamped envelope given.

## 2021-02-11 RX ORDER — PAROXETINE HYDROCHLORIDE 20 MG/1
20 TABLET, FILM COATED ORAL EVERY MORNING
Qty: 90 TABLET | Refills: 0 | Status: SHIPPED | OUTPATIENT
Start: 2021-02-11

## 2021-02-18 ENCOUNTER — APPOINTMENT (OUTPATIENT)
Dept: HEMATOLOGY/ONCOLOGY | Facility: HOSPITAL | Age: 46
End: 2021-02-18
Attending: Other
Payer: MEDICAID

## 2021-02-23 ENCOUNTER — OFFICE VISIT (OUTPATIENT)
Dept: HEMATOLOGY/ONCOLOGY | Facility: HOSPITAL | Age: 46
End: 2021-02-23
Attending: Other
Payer: MEDICAID

## 2021-02-23 VITALS
RESPIRATION RATE: 16 BRPM | SYSTOLIC BLOOD PRESSURE: 113 MMHG | TEMPERATURE: 98 F | HEART RATE: 84 BPM | DIASTOLIC BLOOD PRESSURE: 70 MMHG

## 2021-02-23 DIAGNOSIS — G35 MULTIPLE SCLEROSIS (HCC): Primary | ICD-10-CM

## 2021-02-23 PROCEDURE — 96365 THER/PROPH/DIAG IV INF INIT: CPT

## 2021-02-23 RX ORDER — DALFAMPRIDINE 10 MG/1
TABLET, FILM COATED, EXTENDED RELEASE ORAL
Qty: 60 TABLET | Refills: 11 | Status: SHIPPED | OUTPATIENT
Start: 2021-02-23

## 2021-03-03 ENCOUNTER — HOSPITAL ENCOUNTER (OUTPATIENT)
Dept: ULTRASOUND IMAGING | Age: 46
Discharge: HOME | End: 2021-03-03
Attending: FAMILY MEDICINE
Payer: MEDICAID

## 2021-03-03 DIAGNOSIS — N28.1 CYST OF RIGHT KIDNEY: ICD-10-CM

## 2021-03-03 PROCEDURE — 76775 US EXAM ABDO BACK WALL LIM: CPT | Performed by: FAMILY MEDICINE

## 2021-03-04 ENCOUNTER — TELEPHONE (OUTPATIENT)
Dept: FAMILY MEDICINE CLINIC | Facility: CLINIC | Age: 46
End: 2021-03-04

## 2021-03-04 NOTE — TELEPHONE ENCOUNTER
Received patient's mammogram results from: Gabriel Trujillo  Completed: 3/2/21  Ordered by: [place]  Impression: There is no evidence of maligancy; Routine screening in 1 year.   Results date entered into Health Maintenance; Report placed on provider's desk for

## 2021-03-05 NOTE — PROGRESS NOTES
[facility]    Below are the results of your recently performed labs/tests: All results are within NORMAL limits EXCEPT:    A simple (benign) cyst in the right kidney was noted. No follow up was recommended.  Consider repeating the ultrasound i

## 2021-03-09 ENCOUNTER — TELEPHONE (OUTPATIENT)
Dept: NEUROLOGY | Facility: CLINIC | Age: 46
End: 2021-03-09

## 2021-03-09 NOTE — TELEPHONE ENCOUNTER
Denied- Refill request for zolpidem 5 MG Oral Tab.      Prescription was discontinued on 11/23/20    Called pharmacy spoke to [address]  advising patient is no longer on this medication    Rachelle Villareal MD    12:38 PM  Note     I can try trazodone as a n

## 2021-03-10 ENCOUNTER — PATIENT MESSAGE (OUTPATIENT)
Dept: FAMILY MEDICINE CLINIC | Facility: CLINIC | Age: 46
End: 2021-03-10

## 2021-03-12 ENCOUNTER — PATIENT MESSAGE (OUTPATIENT)
Dept: NEUROLOGY | Facility: CLINIC | Age: 46
End: 2021-03-12

## 2021-03-12 RX ORDER — ZOLPIDEM TARTRATE 5 MG/1
5 TABLET ORAL NIGHTLY PRN
Qty: 30 TABLET | Refills: 5 | Status: SHIPPED | OUTPATIENT
Start: 2021-03-12

## 2021-03-12 NOTE — TELEPHONE ENCOUNTER
Refill request for zolpidem 5 mg, take 1 tab nightly, #30, no refills    LOV: 1/14/21  NOV: None  Last [place] 1[place]21 [place]

## 2021-03-12 NOTE — TELEPHONE ENCOUNTER
From: Hyacinth Carr  To: Ny Nava MD  Sent: 3/12/2021 7:23 AM CST  Subject: Prescription Question    Did [facility] send refill for paroxetine. I'm out.

## 2021-03-15 ENCOUNTER — TELEPHONE (OUTPATIENT)
Dept: FAMILY MEDICINE CLINIC | Facility: CLINIC | Age: 46
End: 2021-03-15

## 2021-03-15 ENCOUNTER — PATIENT MESSAGE (OUTPATIENT)
Dept: FAMILY MEDICINE CLINIC | Facility: CLINIC | Age: 46
End: 2021-03-15

## 2021-03-15 RX ORDER — DOXYCYCLINE HYCLATE 50 MG/1
50 CAPSULE ORAL 2 TIMES DAILY
Qty: 180 CAPSULE | Refills: 0 | Status: SHIPPED | OUTPATIENT
Start: 2021-03-15 | End: 2021-06-15

## 2021-03-15 NOTE — TELEPHONE ENCOUNTER
Received request for orders for pt's incontinence supplies. Report placed on provider's desk for signature and prognosis. Return via fax the order form and clinical notes indicating a medical necessity to: [phone].    LOV for urinary incontinence: 1/12

## 2021-03-15 NOTE — TELEPHONE ENCOUNTER
A refill request was received for:  Requested Prescriptions     Pending Prescriptions Disp Refills   • Doxycycline Hyclate 50 MG Oral Cap 180 capsule 0     Sig: Take 1 capsule (50 mg total) by mouth 2 (two) times daily.      Last refill date: 12/15/2020  Qt

## 2021-03-16 NOTE — TELEPHONE ENCOUNTER
From: Sigifredo Young  To: Trina Shaw MD  Sent: 3/15/2021 4:11 PM CDT  Subject: Non-Urgent Medical Question    Hi Dr freedman is aware of my incontinence for my medical supply diapers and I saw her recently.

## 2021-03-16 NOTE — TELEPHONE ENCOUNTER
Routed to Dr. Virgen Montoya for review and confirmation. Please see telephone encounter from 3/15/21 - confirm if pt needs a f/u appointment, due now.

## 2021-03-17 ENCOUNTER — TELEPHONE (OUTPATIENT)
Dept: NEUROLOGY | Facility: CLINIC | Age: 46
End: 2021-03-17

## 2021-03-17 NOTE — TELEPHONE ENCOUNTER
Patient is on monthly Tysabri Infusion. Infusion center would like to know if infusion can be given 2 days early this month as authorization expires. Please advise.

## 2021-03-23 ENCOUNTER — OFFICE VISIT (OUTPATIENT)
Dept: HEMATOLOGY/ONCOLOGY | Facility: HOSPITAL | Age: 46
End: 2021-03-23
Attending: Other
Payer: MEDICAID

## 2021-03-23 VITALS
SYSTOLIC BLOOD PRESSURE: 116 MMHG | HEART RATE: 84 BPM | OXYGEN SATURATION: 100 % | TEMPERATURE: 98 F | RESPIRATION RATE: 16 BRPM | BODY MASS INDEX: 25 KG/M2 | WEIGHT: 163 LBS | DIASTOLIC BLOOD PRESSURE: 66 MMHG

## 2021-03-23 DIAGNOSIS — G35 MULTIPLE SCLEROSIS (HCC): Primary | ICD-10-CM

## 2021-03-23 PROCEDURE — 96365 THER/PROPH/DIAG IV INF INIT: CPT

## 2021-03-23 NOTE — PROGRESS NOTES
Pt to infusion for monthly Tysabri for MS. Arrived ambulating with use of rolling walker. Complains of fatigue today. Denies any recent changes in her condition. Online [employer] Program checklist completed. PIV to left FA x 1 with good blood return.

## 2021-03-29 ENCOUNTER — TELEPHONE (OUTPATIENT)
Dept: NEUROLOGY | Facility: CLINIC | Age: 46
End: 2021-03-29

## 2021-03-29 DIAGNOSIS — G35 MS (MULTIPLE SCLEROSIS) (HCC): ICD-10-CM

## 2021-03-29 RX ORDER — CLONAZEPAM 0.5 MG/1
1 TABLET ORAL DAILY
Qty: 180 TABLET | Refills: 1 | Status: SHIPPED | OUTPATIENT
Start: 2021-03-29

## 2021-03-29 NOTE — TELEPHONE ENCOUNTER
Completed PA via CoverMyMeds for Clonazepam 0.5mg tablets. Standard fax determination will also be sent to our office directly.

## 2021-03-30 ENCOUNTER — TELEPHONE (OUTPATIENT)
Dept: NEUROLOGY | Facility: CLINIC | Age: 46
End: 2021-03-30

## 2021-03-30 NOTE — TELEPHONE ENCOUNTER
Received PA approval for DALFAMPRIDINE ER 10 MG Oral Tablet 12 Hr, effective 3/7/21 through 3/27/22, placed into scanning bin.

## 2021-04-01 ENCOUNTER — TELEPHONE (OUTPATIENT)
Dept: FAMILY MEDICINE CLINIC | Facility: CLINIC | Age: 46
End: 2021-04-01

## 2021-04-01 NOTE — TELEPHONE ENCOUNTER
Received call from [address] [place] [address] with [employer]. Said she is checking/working on an [employer] risk assessment audit. Needs to verify pt was seen on 8/20/219. Advised yes she was seen. [address] [place] [address] will be faxing over form to complete audit.

## 2021-04-12 ENCOUNTER — PATIENT MESSAGE (OUTPATIENT)
Dept: FAMILY MEDICINE CLINIC | Facility: CLINIC | Age: 46
End: 2021-04-12

## 2021-04-12 ENCOUNTER — PATIENT MESSAGE (OUTPATIENT)
Dept: NEUROLOGY | Facility: CLINIC | Age: 46
End: 2021-04-12

## 2021-04-12 RX ORDER — FLUCONAZOLE 150 MG/1
TABLET ORAL
Qty: 4 TABLET | Refills: 1 | Status: SHIPPED | OUTPATIENT
Start: 2021-04-12 | End: 2021-06-15

## 2021-04-12 RX ORDER — TOPIRAMATE 100 MG/1
100 TABLET, FILM COATED ORAL 2 TIMES DAILY
Qty: 180 TABLET | Refills: 1 | Status: SHIPPED | OUTPATIENT
Start: 2021-04-12

## 2021-04-12 NOTE — TELEPHONE ENCOUNTER
Routed to Dr. Alessandra Mckeon for review and recommendations. Diflucan rx pended.      A refill request was received for:  Requested Prescriptions     Pending Prescriptions Disp Refills   • fluconazole (DIFLUCAN) 150 MG Oral Tab 4 tablet 1     Sig: Take one tablet by

## 2021-04-12 NOTE — TELEPHONE ENCOUNTER
From: Erica Pruett  To: Anahi Alejandro MD  Sent: 4/12/2021 8:20 AM CDT  Subject: Prescription Question    Hi I'm going to need a refill on toprimate since the increase. Thanks.

## 2021-04-19 ENCOUNTER — TELEPHONE (OUTPATIENT)
Dept: FAMILY MEDICINE CLINIC | Facility: CLINIC | Age: 46
End: 2021-04-19

## 2021-04-19 NOTE — TELEPHONE ENCOUNTER
Received call from [employer]. Inquired about fax requesting medical records for pt. Advised if asking for extensive information - fax would have been sent to medical records. She will re-send request as it is unclear if received.

## 2021-04-20 ENCOUNTER — APPOINTMENT (OUTPATIENT)
Dept: HEMATOLOGY/ONCOLOGY | Facility: HOSPITAL | Age: 46
End: 2021-04-20
Attending: Other
Payer: MEDICAID

## 2021-04-27 ENCOUNTER — APPOINTMENT (OUTPATIENT)
Dept: HEMATOLOGY/ONCOLOGY | Facility: HOSPITAL | Age: 46
End: 2021-04-27
Attending: Other
Payer: MEDICAID

## 2021-04-29 ENCOUNTER — APPOINTMENT (OUTPATIENT)
Dept: HEMATOLOGY/ONCOLOGY | Facility: HOSPITAL | Age: 46
End: 2021-04-29
Attending: Other
Payer: MEDICAID

## 2021-04-29 ENCOUNTER — TELEPHONE (OUTPATIENT)
Dept: NEUROLOGY | Facility: CLINIC | Age: 46
End: 2021-04-29

## 2021-05-04 ENCOUNTER — TELEPHONE (OUTPATIENT)
Dept: FAMILY MEDICINE CLINIC | Facility: CLINIC | Age: 46
End: 2021-05-04

## 2021-05-04 RX ORDER — SOLIFENACIN SUCCINATE 10 MG/1
10 TABLET, FILM COATED ORAL NIGHTLY
Qty: 90 TABLET | Refills: 0 | Status: SHIPPED | OUTPATIENT
Start: 2021-05-04 | End: 2021-08-02

## 2021-05-04 NOTE — TELEPHONE ENCOUNTER
A refill request was received for:  Requested Prescriptions     Pending Prescriptions Disp Refills   • Solifenacin Succinate 10 MG Oral Tab 90 tablet 0     Sig: Take 1 tablet (10 mg total) by mouth nightly.      Last refill date: 2/10/21  Qty:90  Last offic

## 2021-05-05 ENCOUNTER — APPOINTMENT (OUTPATIENT)
Dept: HEMATOLOGY/ONCOLOGY | Facility: HOSPITAL | Age: 46
End: 2021-05-05
Attending: Other
Payer: MEDICAID

## 2021-05-07 ENCOUNTER — TELEPHONE (OUTPATIENT)
Dept: NEUROLOGY | Facility: CLINIC | Age: 46
End: 2021-05-07

## 2021-05-07 NOTE — TELEPHONE ENCOUNTER
Received approval for Natalizumab infusions via fax. Infusions will be done at [facility]. Authorization valid 1/1/21-7/2/21. Placed into scanning.

## 2021-06-08 ENCOUNTER — PATIENT MESSAGE (OUTPATIENT)
Dept: FAMILY MEDICINE CLINIC | Facility: CLINIC | Age: 46
End: 2021-06-08

## 2021-06-08 RX ORDER — NITROFURANTOIN 25; 75 MG/1; MG/1
100 CAPSULE ORAL 2 TIMES DAILY
Qty: 20 CAPSULE | Refills: 0 | Status: SHIPPED | OUTPATIENT
Start: 2021-06-08 | End: 2021-06-15

## 2021-06-08 NOTE — TELEPHONE ENCOUNTER
From: Connie Russell  Sent: 6/8/2021 9:52 AM CDT  To: Emmg 12 Dr. Tiara De La Paz  Subject: RE: Prescription Question    I used it.     ----- Message -----  From: Sabine Arellano  Sent: 6/8/21 9:49 AM  To: Connie Russell  Subject: RE: Prescription [facility]

## 2021-06-15 ENCOUNTER — TELEMEDICINE (OUTPATIENT)
Dept: FAMILY MEDICINE CLINIC | Facility: CLINIC | Age: 46
End: 2021-06-15

## 2021-06-15 DIAGNOSIS — G35 MULTIPLE SCLEROSIS (HCC): ICD-10-CM

## 2021-06-15 DIAGNOSIS — N39.0 RECURRENT URINARY TRACT INFECTION: Primary | ICD-10-CM

## 2021-06-15 DIAGNOSIS — N32.81 OVERACTIVE BLADDER: ICD-10-CM

## 2021-06-15 DIAGNOSIS — G47.10 HYPERSOMNIA: ICD-10-CM

## 2021-06-15 DIAGNOSIS — I73.00 RAYNAUD'S DISEASE WITHOUT GANGRENE: ICD-10-CM

## 2021-06-15 DIAGNOSIS — L71.9 ROSACEA, ACNE: ICD-10-CM

## 2021-06-15 PROCEDURE — 99214 OFFICE O/P EST MOD 30 MIN: CPT | Performed by: FAMILY MEDICINE

## 2021-06-15 RX ORDER — NITROFURANTOIN 25; 75 MG/1; MG/1
100 CAPSULE ORAL 2 TIMES DAILY
Qty: 20 CAPSULE | Refills: 0 | Status: SHIPPED | OUTPATIENT
Start: 2021-06-15 | End: 2021-10-27

## 2021-06-15 RX ORDER — DOXYCYCLINE HYCLATE 50 MG/1
50 CAPSULE ORAL 2 TIMES DAILY
Qty: 180 CAPSULE | Refills: 1 | Status: SHIPPED | OUTPATIENT
Start: 2021-06-15 | End: 2021-11-29

## 2021-06-15 RX ORDER — FLUCONAZOLE 150 MG/1
TABLET ORAL
Qty: 4 TABLET | Refills: 1 | Status: SHIPPED | OUTPATIENT
Start: 2021-06-15 | End: 2021-10-27

## 2021-06-15 NOTE — PROGRESS NOTES
Due to the real risk of possible exposure to Coronavirus (CoV-2, COVID-19) in the office/medical building and recommendations for social distancing (key to mitigation/limiting the spread of the virus) a Virtual or Telemedicine visit over the phone was perf Oral Tab Take 10 mg by mouth nightly as needed for Sleep.      • topiramate 25 MG Oral Tab 4 pills at bedtime for 2 weeks, then 1 pill in am and 4 pills qhs 270 tablet 3   • natalizumab 300 MG/15ML Intravenous Conc Inject 15 mL (300 mg total) into the vein prn    2. Raynaud's disease without gangrene  Persistent, chk labs as ordered, f/u P, rec Neurology or Rheum eval if continues to worsen    3. Rosacea, acne  Stable, CPM-doxy 100 mg, f/u 6 mo    4.  Overactive bladder  Persistent, likely due to MS, will CPM

## 2021-07-06 ENCOUNTER — MED REC SCAN ONLY (OUTPATIENT)
Dept: NEUROLOGY | Facility: CLINIC | Age: 46
End: 2021-07-06

## 2021-08-02 RX ORDER — SOLIFENACIN SUCCINATE 10 MG/1
10 TABLET, FILM COATED ORAL NIGHTLY
Qty: 90 TABLET | Refills: 0 | Status: SHIPPED | OUTPATIENT
Start: 2021-08-02 | End: 2021-10-28

## 2021-08-02 NOTE — TELEPHONE ENCOUNTER
A refill request was received for:  Requested Prescriptions     Pending Prescriptions Disp Refills   • Solifenacin Succinate 10 MG Oral Tab 90 tablet 0     Sig: Take 1 tablet (10 mg total) by mouth nightly.      Last refill date: 05/04/2021  Qty:90   Last o

## 2021-09-13 ENCOUNTER — PATIENT MESSAGE (OUTPATIENT)
Dept: FAMILY MEDICINE CLINIC | Facility: CLINIC | Age: 46
End: 2021-09-13

## 2021-09-13 NOTE — TELEPHONE ENCOUNTER
From: Donrenata Polo  To: Jenelle Whitley MD  Sent: 9/13/2021 4:25 PM CDT  Subject: Prescription    I'm coming in September 29. I'm out of my generic nasonex.

## 2021-09-14 ENCOUNTER — PATIENT MESSAGE (OUTPATIENT)
Dept: FAMILY MEDICINE CLINIC | Facility: CLINIC | Age: 46
End: 2021-09-14

## 2021-09-14 RX ORDER — MOMETASONE 50 UG/1
1 SPRAY, METERED NASAL DAILY
Qty: 1 EACH | Refills: 1 | Status: SHIPPED | OUTPATIENT
Start: 2021-09-14 | End: 2021-09-14

## 2021-09-14 RX ORDER — FLUTICASONE PROPIONATE 50 MCG
2 SPRAY, SUSPENSION (ML) NASAL DAILY
Qty: 3 EACH | Refills: 1 | Status: SHIPPED | OUTPATIENT
Start: 2021-09-14

## 2021-09-14 NOTE — TELEPHONE ENCOUNTER
From: Lopez Knapp  Sent: 9/14/2021 5:33 PM CDT  To: Kamar Garvey 12 Dr. Lelo Farrar  Subject: Prescription    They're not going to cover that. I need flucticacasone please.

## 2021-09-30 ENCOUNTER — PATIENT MESSAGE (OUTPATIENT)
Dept: FAMILY MEDICINE CLINIC | Facility: CLINIC | Age: 46
End: 2021-09-30

## 2021-09-30 DIAGNOSIS — K21.9 GASTROESOPHAGEAL REFLUX DISEASE, UNSPECIFIED WHETHER ESOPHAGITIS PRESENT: ICD-10-CM

## 2021-09-30 RX ORDER — OMEPRAZOLE 20 MG/1
20 CAPSULE, DELAYED RELEASE ORAL
Qty: 90 CAPSULE | Refills: 0 | Status: SHIPPED | OUTPATIENT
Start: 2021-09-30

## 2021-09-30 NOTE — TELEPHONE ENCOUNTER
From: Connie Russell  To:  Jewel Gaucher, MD  Sent: 9/30/2021 10:26 AM CDT  Subject: Refill    Can I get a refill on Prilosec please

## 2021-09-30 NOTE — TELEPHONE ENCOUNTER
A refill request was received for:  Requested Prescriptions     Pending Prescriptions Disp Refills   • omeprazole 20 MG Oral Capsule Delayed Release 90 capsule 0     Sig: Take 1 capsule (20 mg total) by mouth every morning before breakfast.     Last refill

## 2021-10-27 ENCOUNTER — OFFICE VISIT (OUTPATIENT)
Dept: FAMILY MEDICINE CLINIC | Facility: CLINIC | Age: 46
End: 2021-10-27
Payer: MEDICAID

## 2021-10-27 VITALS
SYSTOLIC BLOOD PRESSURE: 100 MMHG | DIASTOLIC BLOOD PRESSURE: 60 MMHG | HEIGHT: 67 IN | BODY MASS INDEX: 24.01 KG/M2 | OXYGEN SATURATION: 100 % | HEART RATE: 92 BPM | WEIGHT: 153 LBS

## 2021-10-27 DIAGNOSIS — Z12.11 SCREENING FOR COLON CANCER: ICD-10-CM

## 2021-10-27 DIAGNOSIS — R68.2 DRY MOUTH: ICD-10-CM

## 2021-10-27 DIAGNOSIS — Z87.440 HISTORY OF FREQUENT URINARY TRACT INFECTIONS: ICD-10-CM

## 2021-10-27 DIAGNOSIS — D50.8 OTHER IRON DEFICIENCY ANEMIA: ICD-10-CM

## 2021-10-27 DIAGNOSIS — F43.9 STRESS: ICD-10-CM

## 2021-10-27 DIAGNOSIS — R53.83 OTHER FATIGUE: ICD-10-CM

## 2021-10-27 DIAGNOSIS — G47.10 HYPERSOMNIA: ICD-10-CM

## 2021-10-27 DIAGNOSIS — G35 MULTIPLE SCLEROSIS (HCC): ICD-10-CM

## 2021-10-27 DIAGNOSIS — N32.81 OVERACTIVE BLADDER: ICD-10-CM

## 2021-10-27 DIAGNOSIS — R10.11 ABDOMINAL PAIN, RIGHT UPPER QUADRANT: ICD-10-CM

## 2021-10-27 DIAGNOSIS — Z00.00 ROUTINE MEDICAL EXAM: Primary | ICD-10-CM

## 2021-10-27 DIAGNOSIS — E55.9 VITAMIN D DEFICIENCY: ICD-10-CM

## 2021-10-27 DIAGNOSIS — K21.9 GASTROESOPHAGEAL REFLUX DISEASE, UNSPECIFIED WHETHER ESOPHAGITIS PRESENT: ICD-10-CM

## 2021-10-27 PROCEDURE — 90686 IIV4 VACC NO PRSV 0.5 ML IM: CPT | Performed by: FAMILY MEDICINE

## 2021-10-27 PROCEDURE — 3074F SYST BP LT 130 MM HG: CPT | Performed by: FAMILY MEDICINE

## 2021-10-27 PROCEDURE — 3008F BODY MASS INDEX DOCD: CPT | Performed by: FAMILY MEDICINE

## 2021-10-27 PROCEDURE — 90471 IMMUNIZATION ADMIN: CPT | Performed by: FAMILY MEDICINE

## 2021-10-27 PROCEDURE — 3078F DIAST BP <80 MM HG: CPT | Performed by: FAMILY MEDICINE

## 2021-10-27 PROCEDURE — 99213 OFFICE O/P EST LOW 20 MIN: CPT | Performed by: FAMILY MEDICINE

## 2021-10-27 PROCEDURE — 99396 PREV VISIT EST AGE 40-64: CPT | Performed by: FAMILY MEDICINE

## 2021-10-27 RX ORDER — FLUCONAZOLE 150 MG/1
TABLET ORAL
Qty: 8 TABLET | Refills: 1 | Status: SHIPPED | OUTPATIENT
Start: 2021-10-27

## 2021-10-27 RX ORDER — NITROFURANTOIN 25; 75 MG/1; MG/1
100 CAPSULE ORAL 2 TIMES DAILY
Qty: 20 CAPSULE | Refills: 2 | Status: SHIPPED | OUTPATIENT
Start: 2021-10-27

## 2021-10-27 RX ORDER — METHYLPHENIDATE HYDROCHLORIDE 10 MG/1
TABLET ORAL
COMMUNITY
Start: 2021-08-05

## 2021-10-28 RX ORDER — SOLIFENACIN SUCCINATE 10 MG/1
10 TABLET, FILM COATED ORAL NIGHTLY
Qty: 90 TABLET | Refills: 0 | Status: SHIPPED | OUTPATIENT
Start: 2021-10-28 | End: 2022-01-24

## 2021-10-28 RX ORDER — FLUCONAZOLE 150 MG/1
TABLET ORAL
Qty: 8 TABLET | Refills: 1 | OUTPATIENT
Start: 2021-10-28

## 2021-10-28 NOTE — TELEPHONE ENCOUNTER
A refill request was received for:  Requested Prescriptions     Pending Prescriptions Disp Refills   • Solifenacin Succinate 10 MG Oral Tab 90 tablet 0     Sig: Take 1 tablet (10 mg total) by mouth nightly.    • fluconazole (DIFLUCAN) 150 MG Oral Tab 8 tabl

## 2021-10-28 NOTE — H&P
HPI:   Patient presents with:   Follow - Up: HAS SEVERALCONCERNS  Physical  Multiple Sclerosis  Musculoskeletal Problem  Fatigue  Bladder Problem  Gastro-esophageal Reflux  Abdominal Pain      Lennox Blanchard is a 55year old female who presents for a c tablet by mouth once. May repeat dose in 2 days  PRN. 8 tablet 1   • nitrofurantoin monohydrate macro 100 MG Oral Cap Take 1 capsule (100 mg total) by mouth 2 (two) times daily.  X 5-10 days prn 20 capsule 2   • omeprazole 20 MG Oral Capsule Delayed Release Smoker      Smokeless tobacco: Never Used    Vaping Use      Vaping Use: Never used    Substance and Sexual Activity      Alcohol use: Not Currently      Drug use: Never    Other Topics      Concerns:        Caffeine Concern: Yes          ice tea daily DP, PT pulses wnl B  MS: unable to walk for any signif distance due to MS, uses wheel chair  PSYCH: mood and affect WNL, speech and thought congruent  NEURO: A&O x 3, R UE weakness, R LE muscle spasm and no voluntary movement    ASSESSMENT AND PLAN:   1.  D bladder  Improved, discussed that med may be contributing to dry mouth as well, will CPM for now, consider Oletha Maizes (now generic), f/u 6 mo or sooner prn    6.  Gastroesophageal reflux disease, unspecified whether esophagitis present  Chronic, now w R U Q abd

## 2021-11-23 RX ORDER — FAMOTIDINE 40 MG/1
40 TABLET, FILM COATED ORAL DAILY
Qty: 90 TABLET | Refills: 0 | Status: CANCELLED | OUTPATIENT
Start: 2021-11-23

## 2021-11-29 ENCOUNTER — TELEPHONE (OUTPATIENT)
Dept: FAMILY MEDICINE CLINIC | Facility: CLINIC | Age: 46
End: 2021-11-29

## 2021-11-29 RX ORDER — DOXYCYCLINE HYCLATE 50 MG/1
50 CAPSULE ORAL 2 TIMES DAILY
Qty: 180 CAPSULE | Refills: 1 | Status: SHIPPED | OUTPATIENT
Start: 2021-11-29

## 2021-11-29 NOTE — TELEPHONE ENCOUNTER
A refill request was received for:  Requested Prescriptions     Pending Prescriptions Disp Refills   • doxycycline 50 MG Oral Cap 180 capsule 1     Sig: Take 1 capsule (50 mg total) by mouth 2 (two) times daily.      Last refill date:  06/15/2021  Qty: 180

## 2021-11-30 ENCOUNTER — TELEPHONE (OUTPATIENT)
Dept: NEUROLOGY | Facility: CLINIC | Age: 46
End: 2021-11-30

## 2021-11-30 NOTE — TELEPHONE ENCOUNTER
Called Patient to make a follow up appt and   she told me she will now be seeing a   Neurologist closer to her home; this is too far to come with her condition.

## 2021-11-30 NOTE — TELEPHONE ENCOUNTER
Received re-authorization form for Anne. Form filled out and signed by Dr. Bridget Solomon. Faxed to [employer]. LOV: 1/12/21. Follow-up appointment needed.

## 2021-12-01 NOTE — TELEPHONE ENCOUNTER
Received Notice of patient authorization fax from [employer] prescribing program. Authorization for TYSABRI infusions for MS @ [facility] valid from 12/1/21-7/3/22.    Pt enrollment #[id number]  SITE AUTHORIZATION # [id number]    Copy of a

## 2022-01-24 RX ORDER — SOLIFENACIN SUCCINATE 10 MG/1
TABLET, FILM COATED ORAL
Qty: 90 TABLET | Refills: 0 | Status: SHIPPED | OUTPATIENT
Start: 2022-01-24

## 2022-01-24 NOTE — TELEPHONE ENCOUNTER
Requested Prescriptions     Pending Prescriptions Disp Refills   • SOLIFENACIN SUCCINATE 10 MG Oral Tab [Pharmacy Med Name: SOLIFENACIN 10MG TABLETS] 90 tablet 0     Sig: TAKE 1 TABLET(10 MG) BY MOUTH EVERY NIGHT     LAST REFILL DATE 10/28/2021   Amanda Collier

## 2022-02-24 RX ORDER — FAMOTIDINE 20 MG/1
TABLET, FILM COATED ORAL
Qty: 180 TABLET | Refills: 0 | Status: SHIPPED | OUTPATIENT
Start: 2022-02-24 | End: 2022-03-22

## 2022-02-24 RX ORDER — NITROFURANTOIN 25; 75 MG/1; MG/1
CAPSULE ORAL
Qty: 20 CAPSULE | Refills: 2 | Status: SHIPPED | OUTPATIENT
Start: 2022-02-24 | End: 2022-03-22

## 2022-03-22 ENCOUNTER — OFFICE VISIT (OUTPATIENT)
Dept: FAMILY MEDICINE CLINIC | Facility: CLINIC | Age: 47
End: 2022-03-22
Payer: MEDICAID

## 2022-03-22 VITALS
HEIGHT: 68 IN | RESPIRATION RATE: 16 BRPM | WEIGHT: 145 LBS | BODY MASS INDEX: 21.98 KG/M2 | OXYGEN SATURATION: 98 % | SYSTOLIC BLOOD PRESSURE: 102 MMHG | DIASTOLIC BLOOD PRESSURE: 70 MMHG | HEART RATE: 94 BPM

## 2022-03-22 DIAGNOSIS — R29.898 WEAKNESS OF RIGHT LEG: ICD-10-CM

## 2022-03-22 DIAGNOSIS — R89.9 ABNORMAL LABORATORY TEST: ICD-10-CM

## 2022-03-22 DIAGNOSIS — M62.838 MUSCLE SPASM: ICD-10-CM

## 2022-03-22 DIAGNOSIS — R21 RASH AND NONSPECIFIC SKIN ERUPTION: ICD-10-CM

## 2022-03-22 DIAGNOSIS — Z87.440 HISTORY OF FREQUENT URINARY TRACT INFECTIONS: ICD-10-CM

## 2022-03-22 DIAGNOSIS — N32.81 OVERACTIVE BLADDER: Primary | ICD-10-CM

## 2022-03-22 DIAGNOSIS — G35 MULTIPLE SCLEROSIS (HCC): ICD-10-CM

## 2022-03-22 DIAGNOSIS — F41.1 GENERALIZED ANXIETY DISORDER: ICD-10-CM

## 2022-03-22 DIAGNOSIS — K21.9 GASTROESOPHAGEAL REFLUX DISEASE, UNSPECIFIED WHETHER ESOPHAGITIS PRESENT: ICD-10-CM

## 2022-03-22 LAB
APPEARANCE: CLEAR
BILIRUBIN: NEGATIVE
GLUCOSE (URINE DIPSTICK): NEGATIVE MG/DL
KETONES (URINE DIPSTICK): NEGATIVE MG/DL
LEUKOCYTES: NEGATIVE
MULTISTIX LOT#: NORMAL NUMERIC
NITRITE, URINE: NEGATIVE
OCCULT BLOOD: NEGATIVE
PH, URINE: 6 (ref 4.5–8)
PROTEIN (URINE DIPSTICK): NEGATIVE MG/DL
SPECIFIC GRAVITY: 1.01 (ref 1–1.03)
UROBILINOGEN,SEMI-QN: 0.2 MG/DL (ref 0–1.9)

## 2022-03-22 PROCEDURE — 3078F DIAST BP <80 MM HG: CPT | Performed by: FAMILY MEDICINE

## 2022-03-22 PROCEDURE — 3074F SYST BP LT 130 MM HG: CPT | Performed by: FAMILY MEDICINE

## 2022-03-22 PROCEDURE — 99215 OFFICE O/P EST HI 40 MIN: CPT | Performed by: FAMILY MEDICINE

## 2022-03-22 PROCEDURE — 3008F BODY MASS INDEX DOCD: CPT | Performed by: FAMILY MEDICINE

## 2022-03-22 PROCEDURE — 81001 URINALYSIS AUTO W/SCOPE: CPT | Performed by: FAMILY MEDICINE

## 2022-03-22 RX ORDER — FLUCONAZOLE 150 MG/1
TABLET ORAL
Qty: 9 TABLET | Refills: 1 | Status: SHIPPED | OUTPATIENT
Start: 2022-03-22

## 2022-03-22 RX ORDER — OMEPRAZOLE 20 MG/1
20 CAPSULE, DELAYED RELEASE ORAL
Qty: 90 CAPSULE | Refills: 0 | Status: SHIPPED | OUTPATIENT
Start: 2022-03-22

## 2022-03-24 NOTE — PROGRESS NOTES
[facility]    Attached are the results of your recently performed labs/tests: All results are essentially within NORMAL limits. Follow up as needed.     Take care,     Albina Mckenna MD  3/24/2022    (Report(s) are attached, future lab/test orders or any referrals are in your chart/MyChart or will be mailed to you)

## 2022-04-02 ENCOUNTER — PATIENT MESSAGE (OUTPATIENT)
Dept: FAMILY MEDICINE CLINIC | Facility: CLINIC | Age: 47
End: 2022-04-02

## 2022-04-04 NOTE — TELEPHONE ENCOUNTER
From: Deana Hernandez  To: Odilon Perera MD  Sent: 4/2/2022 8:26 AM CDT  Subject: Home health     I've dealt with that home health before. They don't call back.

## 2022-04-04 NOTE — TELEPHONE ENCOUNTER
[facility] vendor contacted to find out whether they had received the referral and order to perform ordered blood draw per Dr Chandu Patel (Hgb  A1C and B6 levels). They stated pt is not currently a pt/ has not been since 2020 and have not received a referral or orders. Researching further, the referral has not yet been approved by [facility]. [facility] also stated they do not currently travel to [facility] due to staffing shortages in that area. They are suggesting pt be referred to either [facility] or [facility]  agencies that do cover [facility]. Will discuss w/ Dr Chandu Patel.

## 2022-04-25 RX ORDER — SOLIFENACIN SUCCINATE 10 MG/1
TABLET, FILM COATED ORAL
Qty: 90 TABLET | Refills: 0 | Status: SHIPPED | OUTPATIENT
Start: 2022-04-25

## 2022-04-25 RX ORDER — DOXYCYCLINE HYCLATE 50 MG/1
CAPSULE ORAL
Qty: 180 CAPSULE | Refills: 0 | Status: SHIPPED | OUTPATIENT
Start: 2022-04-25

## 2022-05-11 ENCOUNTER — TELEPHONE (OUTPATIENT)
Dept: FAMILY MEDICINE CLINIC | Facility: CLINIC | Age: 47
End: 2022-05-11

## 2022-05-11 NOTE — TELEPHONE ENCOUNTER
Prescription approved Received patient's mammogram results from: Kim Smith  Completed: 5/9/2022  Ordered by: Dr. Marbin Malik: WNL-There is no mammographic evidence of malignancy  Results date entered into Health Maintenance; Report placed on provider's desk for review.

## 2022-05-20 ENCOUNTER — PATIENT MESSAGE (OUTPATIENT)
Dept: FAMILY MEDICINE CLINIC | Facility: CLINIC | Age: 47
End: 2022-05-20

## 2022-06-01 NOTE — TELEPHONE ENCOUNTER
Patient requesting Dr Deangelo Peñaloza order a home health visit for phlebotomy/ Hgb A1C blood draw bc she has issues getting put of the house/ getting a ride in the morning and this would be a fasting am blood test.    Patient informed that unless she is considered homebound (CMS rules), we cannot order home health services. .  If she cannot physically  present herself to an outpatient lab she would need to find a mobile lab provider that is covered by her insurance plan and is willing to come to her home to draw the blood specimen for her. Patient provided with contact information for one mobile lab provider this office is aware of ([facility]) for her to research insurance coverage with. Alternatively patient instructed to call her insurer ([address] [place] [address]) to ask for a contracted mobile lab provider that is covered. Once pt has located a covered mobile lab provider, the lab provider can contact this office to obtain the physician order for the Hgb A1C and B6 testing. Patient verbalizes understanding. Will await return call from either patient or mobile lab provider.

## 2022-06-02 NOTE — TELEPHONE ENCOUNTER
Patient responding via [employer]  that she researched [employer] mobile lab vendor and this vendor accepts her [facility] Plan. New Lab Orders (original lab order by Dr Junior Swanson 3/26/22) faxed accordingly to [facility].

## 2022-06-07 ENCOUNTER — TELEPHONE (OUTPATIENT)
Dept: FAMILY MEDICINE CLINIC | Facility: CLINIC | Age: 47
End: 2022-06-07

## 2022-06-10 RX ORDER — FAMOTIDINE 20 MG/1
TABLET, FILM COATED ORAL
Qty: 180 TABLET | Refills: 0 | Status: SHIPPED | OUTPATIENT
Start: 2022-06-10

## 2022-06-22 ENCOUNTER — TELEPHONE (OUTPATIENT)
Dept: FAMILY MEDICINE CLINIC | Facility: CLINIC | Age: 47
End: 2022-06-22

## 2022-06-27 ENCOUNTER — TELEPHONE (OUTPATIENT)
Dept: NEUROLOGY | Facility: CLINIC | Age: 47
End: 2022-06-27

## 2022-06-27 NOTE — TELEPHONE ENCOUNTER
Received Notice of patient re-authorization fax from [employer] prescribing program- Authorization for TYSABRI infusions. I reviewed the patients chart and saw that she has been seeing Dr. Juan M Gonzales out of [address] [place] [address]. I spoke with the patient and she confirmed that Dr. Jerelyn Najjar is now her established neurologist.  Paper work has been discarded. Reviewed and electronically signed by:  [address], [facility]

## 2022-07-20 RX ORDER — TOPIRAMATE 100 MG/1
TABLET, FILM COATED ORAL
Qty: 180 TABLET | Refills: 1 | OUTPATIENT
Start: 2022-07-20

## 2022-07-20 RX ORDER — DOXYCYCLINE HYCLATE 50 MG/1
CAPSULE ORAL
Qty: 180 CAPSULE | Refills: 0 | Status: SHIPPED | OUTPATIENT
Start: 2022-07-20

## 2022-07-20 RX ORDER — SOLIFENACIN SUCCINATE 10 MG/1
TABLET, FILM COATED ORAL
Qty: 90 TABLET | Refills: 0 | Status: SHIPPED | OUTPATIENT
Start: 2022-07-20

## 2022-09-12 ENCOUNTER — PATIENT MESSAGE (OUTPATIENT)
Dept: FAMILY MEDICINE CLINIC | Facility: CLINIC | Age: 47
End: 2022-09-12

## 2022-09-12 RX ORDER — FLUTICASONE PROPIONATE 50 MCG
2 SPRAY, SUSPENSION (ML) NASAL DAILY
Qty: 3 EACH | Refills: 1 | Status: SHIPPED | OUTPATIENT
Start: 2022-09-12

## 2022-09-12 NOTE — TELEPHONE ENCOUNTER
From: Tucker Cutler  To: Iraj Argueta MD  Sent: 9/12/2022 7:14 AM CDT  Subject: Refill     Hi can I have a refill on fluconitistisole allergies. I can't get appointment til Oct 28.

## 2022-09-19 RX ORDER — FAMOTIDINE 20 MG/1
TABLET, FILM COATED ORAL
Qty: 180 TABLET | Refills: 0 | Status: SHIPPED | OUTPATIENT
Start: 2022-09-19

## 2022-09-26 ENCOUNTER — TELEPHONE (OUTPATIENT)
Dept: FAMILY MEDICINE CLINIC | Facility: CLINIC | Age: 47
End: 2022-09-26

## 2022-09-26 ENCOUNTER — PATIENT MESSAGE (OUTPATIENT)
Dept: FAMILY MEDICINE CLINIC | Facility: CLINIC | Age: 47
End: 2022-09-26

## 2022-09-26 DIAGNOSIS — D50.8 OTHER IRON DEFICIENCY ANEMIA: Primary | ICD-10-CM

## 2022-10-03 RX ORDER — POLYETHYLENE GLYCOL 3350 17 G/17G
17 POWDER, FOR SOLUTION ORAL DAILY
Qty: 90 EACH | Refills: 3 | Status: SHIPPED | OUTPATIENT
Start: 2022-10-03

## 2022-10-03 RX ORDER — FERROUS SULFATE 325(65) MG
325 TABLET ORAL
Qty: 90 TABLET | Refills: 3 | Status: SHIPPED | OUTPATIENT
Start: 2022-10-03

## 2022-10-03 NOTE — TELEPHONE ENCOUNTER
From: Sofie Linn  Sent: 10/2/2022 6:52 PM CDT  To: [facility] Room 12 Dr. Kiara Pierre  Subject: Lab results    Would you please prescribe iron and miralax and I'll see if my insurance cover it.  Thanks

## 2022-10-14 RX ORDER — SOLIFENACIN SUCCINATE 10 MG/1
TABLET, FILM COATED ORAL
Qty: 90 TABLET | Refills: 0 | Status: SHIPPED | OUTPATIENT
Start: 2022-10-14

## 2022-10-14 RX ORDER — DOXYCYCLINE HYCLATE 50 MG/1
CAPSULE ORAL
Qty: 180 CAPSULE | Refills: 0 | Status: SHIPPED | OUTPATIENT
Start: 2022-10-14

## 2022-10-24 ENCOUNTER — OFFICE VISIT (OUTPATIENT)
Dept: FAMILY MEDICINE CLINIC | Facility: CLINIC | Age: 47
End: 2022-10-24
Payer: MEDICAID

## 2022-10-24 VITALS
WEIGHT: 145 LBS | DIASTOLIC BLOOD PRESSURE: 72 MMHG | OXYGEN SATURATION: 97 % | SYSTOLIC BLOOD PRESSURE: 120 MMHG | HEART RATE: 86 BPM | BODY MASS INDEX: 21.98 KG/M2 | HEIGHT: 68 IN

## 2022-10-24 DIAGNOSIS — Z23 ENCOUNTER FOR IMMUNIZATION: ICD-10-CM

## 2022-10-24 DIAGNOSIS — G35 MULTIPLE SCLEROSIS (HCC): ICD-10-CM

## 2022-10-24 DIAGNOSIS — N32.81 OVERACTIVE BLADDER: ICD-10-CM

## 2022-10-24 DIAGNOSIS — B35.1 ONYCHOMYCOSIS: Primary | ICD-10-CM

## 2022-10-24 DIAGNOSIS — L60.0 INGROWN TOENAIL OF LEFT FOOT: ICD-10-CM

## 2022-10-24 RX ORDER — MELATONIN
325
COMMUNITY

## 2022-12-19 ENCOUNTER — TELEPHONE (OUTPATIENT)
Dept: FAMILY MEDICINE CLINIC | Facility: CLINIC | Age: 47
End: 2022-12-19

## 2022-12-19 RX ORDER — FAMOTIDINE 20 MG/1
TABLET, FILM COATED ORAL
Qty: 180 TABLET | Refills: 0 | Status: SHIPPED | OUTPATIENT
Start: 2022-12-19

## 2022-12-22 ENCOUNTER — PATIENT MESSAGE (OUTPATIENT)
Dept: FAMILY MEDICINE CLINIC | Facility: CLINIC | Age: 47
End: 2022-12-22

## 2022-12-22 ENCOUNTER — TELEPHONE (OUTPATIENT)
Dept: FAMILY MEDICINE CLINIC | Facility: CLINIC | Age: 47
End: 2022-12-22

## 2022-12-22 DIAGNOSIS — G35 MULTIPLE SCLEROSIS (HCC): Primary | ICD-10-CM

## 2022-12-22 NOTE — TELEPHONE ENCOUNTER
Received PA for omeprazole. Completed PA, but also saw pt taking Famotidine.  Asking which one pt is currently taking

## 2022-12-23 RX ORDER — MULTIVITAMIN WITH IRON
250 TABLET ORAL AS DIRECTED
COMMUNITY

## 2023-01-05 RX ORDER — DIAPER,BRIEF,ADULT, DISPOSABLE
EACH MISCELLANEOUS
Qty: 32 EACH | Refills: 4 | Status: SHIPPED | OUTPATIENT
Start: 2023-01-05 | End: 2023-01-05

## 2023-01-05 RX ORDER — DIAPER,BRIEF,ADULT, DISPOSABLE
EACH MISCELLANEOUS
Qty: 32 EACH | Refills: 4 | Status: SHIPPED | OUTPATIENT
Start: 2023-01-05

## 2023-01-05 NOTE — TELEPHONE ENCOUNTER
From: Kandy Dooley  Sent: 1/5/2023 1:47 PM CST  To: Saurabh 12 Clinical Staff  Subject: Med    Please fax order for adult diapers size medium to [phone]. Dx. Multiple sclerosis.  Thx.

## 2023-01-09 ENCOUNTER — TELEMEDICINE (OUTPATIENT)
Dept: FAMILY MEDICINE CLINIC | Facility: CLINIC | Age: 48
End: 2023-01-09
Payer: MEDICAID

## 2023-01-09 ENCOUNTER — TELEPHONE (OUTPATIENT)
Dept: FAMILY MEDICINE CLINIC | Facility: CLINIC | Age: 48
End: 2023-01-09

## 2023-01-09 DIAGNOSIS — K21.9 GASTROESOPHAGEAL REFLUX DISEASE, UNSPECIFIED WHETHER ESOPHAGITIS PRESENT: ICD-10-CM

## 2023-01-09 DIAGNOSIS — N32.81 OVERACTIVE BLADDER: ICD-10-CM

## 2023-01-09 DIAGNOSIS — G35 MULTIPLE SCLEROSIS (HCC): Primary | ICD-10-CM

## 2023-01-09 DIAGNOSIS — Z91.81 HISTORY OF FALL: ICD-10-CM

## 2023-01-09 RX ORDER — OMEPRAZOLE 20 MG/1
20 CAPSULE, DELAYED RELEASE ORAL
Qty: 90 CAPSULE | Refills: 1 | Status: SHIPPED | OUTPATIENT
Start: 2023-01-09

## 2023-01-09 RX ORDER — SOLIFENACIN SUCCINATE 10 MG/1
10 TABLET, FILM COATED ORAL NIGHTLY
Qty: 90 TABLET | Refills: 1 | Status: SHIPPED | OUTPATIENT
Start: 2023-01-09

## 2023-01-09 NOTE — TELEPHONE ENCOUNTER
Patient would benefit from bed rail due to MS/hx of falls. Has also been referred to home health for OT/PT. Can you please see how we can facilitate this for her? Thanks! Also-please fax new Depends order (in chart) to [phone]. Thanks!

## 2023-01-11 ENCOUNTER — PATIENT MESSAGE (OUTPATIENT)
Dept: FAMILY MEDICINE CLINIC | Facility: CLINIC | Age: 48
End: 2023-01-11

## 2023-01-11 DIAGNOSIS — G35 MS (MULTIPLE SCLEROSIS) (HCC): Primary | ICD-10-CM

## 2023-01-11 NOTE — TELEPHONE ENCOUNTER
RN received call from [facility]. Per Carolyn Parrish at [facility], they do have have adequate staffing to go to [facility] for OT at this time. Will look into other [facility] options for patient.

## 2023-01-12 ENCOUNTER — PATIENT MESSAGE (OUTPATIENT)
Dept: FAMILY MEDICINE CLINIC | Facility: CLINIC | Age: 48
End: 2023-01-12

## 2023-01-12 NOTE — TELEPHONE ENCOUNTER
From: Lizz Salinas  To: VIET Estrada  Sent: 1/11/2023 8:49 AM CST  Subject: Diapers     Were you able to fax diapers request? And do I call for pt/ot?

## 2023-01-13 RX ORDER — DOXYCYCLINE HYCLATE 50 MG/1
50 CAPSULE ORAL 2 TIMES DAILY
Qty: 180 CAPSULE | Refills: 0 | Status: SHIPPED | OUTPATIENT
Start: 2023-01-13

## 2023-01-17 ENCOUNTER — PATIENT MESSAGE (OUTPATIENT)
Dept: FAMILY MEDICINE CLINIC | Facility: CLINIC | Age: 48
End: 2023-01-17

## 2023-01-17 DIAGNOSIS — G35 MS (MULTIPLE SCLEROSIS) (HCC): Primary | ICD-10-CM

## 2023-01-18 NOTE — TELEPHONE ENCOUNTER
Referral, H+P, and demographic face sheet faxed to [facility] for intake.

## 2023-01-19 NOTE — TELEPHONE ENCOUNTER
Received voicemail from [facility]. Per Brenda Gonzalez, patient's insurance is OON for home care services for patient.

## 2023-02-07 ENCOUNTER — TELEPHONE (OUTPATIENT)
Dept: FAMILY MEDICINE CLINIC | Facility: CLINIC | Age: 48
End: 2023-02-07

## 2023-02-07 NOTE — TELEPHONE ENCOUNTER
Pt was given Omeprazole 20mg on 1/9/2023 - Take 1 cap PO q AM before breakfast     Per insurance / pharmacy rx not covered. Would you like to rx'd an alternate or send for PA? Please advice.

## 2023-03-08 ENCOUNTER — PATIENT MESSAGE (OUTPATIENT)
Dept: FAMILY MEDICINE CLINIC | Facility: CLINIC | Age: 48
End: 2023-03-08

## 2023-03-08 ENCOUNTER — TELEMEDICINE (OUTPATIENT)
Dept: FAMILY MEDICINE CLINIC | Facility: CLINIC | Age: 48
End: 2023-03-08
Payer: MEDICAID

## 2023-03-08 DIAGNOSIS — M25.561 ACUTE PAIN OF RIGHT KNEE: Primary | ICD-10-CM

## 2023-03-08 PROCEDURE — 99213 OFFICE O/P EST LOW 20 MIN: CPT | Performed by: NURSE PRACTITIONER

## 2023-03-08 RX ORDER — NAPROXEN 500 MG/1
500 TABLET ORAL 2 TIMES DAILY WITH MEALS
Qty: 28 TABLET | Refills: 0 | Status: SHIPPED | OUTPATIENT
Start: 2023-03-08 | End: 2023-03-22

## 2023-03-08 NOTE — TELEPHONE ENCOUNTER
Will prescribe Voltarren gel and she may take prescription Aleve (naproxen) twice daily with food. Cannot take more than directed or any other Aleve/ibuprofen/etc at home. May alternate with Tylenol. Recommend to elevate and ice affected area and may wrap with a brace as needed. Follow-up if no improvement.

## 2023-03-08 NOTE — TELEPHONE ENCOUNTER
Can you please triage and find out when pain started, what she's tried, what has worked in the past, etc.? Thanks!

## 2023-03-08 NOTE — TELEPHONE ENCOUNTER
RN called to triage patient. Pt c/o right knee pain \"ms leg\" and that she does not normally have feeling in her MS leg. Pt describes pain as \"throbbing, aching, feels like arthritis\"     Pt denies falls or injury to the knee. Pt denies redness swelling, patient is concerned as she usually does not feel pain in this knee. Pt denies CP, sob, dizziness. Pt is taking aleve, tylenol, and using voltaren already with limited relief \"only 30 minutes\" patient cannot drive, RN instructed pt to be seen. ED precautions given to patient. Pt scheduled for virtual visit with Eugenio Toure today.

## 2023-03-09 ENCOUNTER — MED REC SCAN ONLY (OUTPATIENT)
Dept: FAMILY MEDICINE CLINIC | Facility: CLINIC | Age: 48
End: 2023-03-09

## 2023-03-09 NOTE — TELEPHONE ENCOUNTER
Spoke to pt and she will be calling to have them fax over the results at Dr. Anjana Gongora, [address] office stated pt needed to call them. Fax number provided to pt.

## 2023-03-10 RX ORDER — FAMOTIDINE 20 MG/1
TABLET, FILM COATED ORAL
Qty: 180 TABLET | Refills: 0 | Status: SHIPPED | OUTPATIENT
Start: 2023-03-10

## 2023-03-13 NOTE — TELEPHONE ENCOUNTER
Message routed to [facility] for review and recommendations. Pt needs routine labs (?). New appt?

## 2023-03-13 NOTE — TELEPHONE ENCOUNTER
I recommend PT-I know patient has issues with leaving the home, had previously recommended home health but there was an insurance issue and I believe she decided not to pursue it. I think PT would help. She should also contact neurology to determine if they feel this is an MS flare.

## 2023-03-14 DIAGNOSIS — K21.9 GASTROESOPHAGEAL REFLUX DISEASE, UNSPECIFIED WHETHER ESOPHAGITIS PRESENT: ICD-10-CM

## 2023-03-14 RX ORDER — OMEPRAZOLE 20 MG/1
20 CAPSULE, DELAYED RELEASE ORAL
Qty: 90 CAPSULE | Refills: 1 | Status: SHIPPED | OUTPATIENT
Start: 2023-03-14

## 2023-04-05 RX ORDER — DOXYCYCLINE HYCLATE 50 MG/1
CAPSULE ORAL
Qty: 180 CAPSULE | Refills: 0 | Status: SHIPPED | OUTPATIENT
Start: 2023-04-05

## 2023-04-05 NOTE — TELEPHONE ENCOUNTER
A refill request was received for:  Requested Prescriptions     Pending Prescriptions Disp Refills    DOXYCYCLINE 50 MG Oral Cap [Pharmacy Med Name: DOXYCYCLINE HYC 50MG CAPS] 180 capsule 0     Sig: TAKE 1 CAPSULE(50 MG) BY MOUTH TWICE DAILY     Last refill date:  03/10/2023  Qty: 60  Last office visit: 03/08/2023  When is follow up due: PRN    No future appointments.

## 2023-04-21 ENCOUNTER — TELEPHONE (OUTPATIENT)
Dept: FAMILY MEDICINE CLINIC | Facility: CLINIC | Age: 48
End: 2023-04-21

## 2023-04-21 NOTE — TELEPHONE ENCOUNTER
Working on omeprazole PA form. I called pharm, pharm tech said pt picked up #30 on 4/3/23 at no charge. Plan however will only cover #120 for a year. I called pt to go over PA questions to complete form. Said she was diagnosed with Echavarria's esophagus about 2 mo ago, testing done at [facility]. She is seeing GI for f/u in May, they will be ordering rx and will be completing PA given new dx. Advised I would notify [facility] of new dx. No further questions at this time. Will hold off on PA.

## 2023-05-30 RX ORDER — FAMOTIDINE 20 MG/1
TABLET, FILM COATED ORAL
Qty: 180 TABLET | Refills: 0 | Status: SHIPPED | OUTPATIENT
Start: 2023-05-30

## 2023-05-31 ENCOUNTER — PATIENT MESSAGE (OUTPATIENT)
Dept: FAMILY MEDICINE CLINIC | Facility: CLINIC | Age: 48
End: 2023-05-31

## 2023-05-31 DIAGNOSIS — N39.0 RECURRENT URINARY TRACT INFECTION: Primary | ICD-10-CM

## 2023-05-31 RX ORDER — NITROFURANTOIN 25; 75 MG/1; MG/1
100 CAPSULE ORAL 2 TIMES DAILY
Qty: 14 CAPSULE | Refills: 0 | Status: SHIPPED | OUTPATIENT
Start: 2023-05-31 | End: 2023-06-07

## 2023-05-31 NOTE — TELEPHONE ENCOUNTER
RN s/w patient. Patient states that she has MS and \"she is constant going\" and the Macrobid helps her. Patient went to urogyne at [facility] and was recommended to have the script for Bonnie Aldrich 103. Pt denies fever, chills, blood in urine, abdominal pain, lower back pain at this time. Patient states that she does not want to go to the [address] [place] [address] [address]- pt is in process of getting on a \"bladder medication\"     Patient states that \"this is an MS thing and she has to use the medication one time a month\"     Will notify Vanesa Edouard. Pt instructed to follow up in person if symptoms do not improve. ED precautions given to patient. Pt instructed to seek care at the ED for fever, chills, abdominal pain, back pain, or blood in urine.

## 2023-05-31 NOTE — TELEPHONE ENCOUNTER
Refill sent. If no improvement with medication patient must follow-up in-person. To ER with fever/chills.

## 2023-05-31 NOTE — TELEPHONE ENCOUNTER
From: Arlette Bernardo  To: VIET Mireles  Sent: 5/31/2023 7:29 AM CDT  Subject: Refil    Can I get a refil on my nitrofurantoin that Dr freedman gave to me for my recurring UTI from [address].  Thank you

## 2023-06-15 ENCOUNTER — TELEPHONE (OUTPATIENT)
Dept: FAMILY MEDICINE CLINIC | Facility: CLINIC | Age: 48
End: 2023-06-15

## 2023-06-15 NOTE — TELEPHONE ENCOUNTER
S/w Lluvia      Pt stated she has been working with her GI Meagan Coats at [phone] since that office is working on [employer] for Bobby.      Pt informed I will contact GI office with [address] request.

## 2023-06-15 NOTE — TELEPHONE ENCOUNTER
S/w Crystal Colby at Dr. Hill Harris office advised that [facility] needs progress notes for PA of omerperazole. She will send message to the nurses.

## 2023-06-20 DIAGNOSIS — N32.81 OVERACTIVE BLADDER: ICD-10-CM

## 2023-06-20 RX ORDER — SOLIFENACIN SUCCINATE 10 MG/1
TABLET, FILM COATED ORAL
Qty: 90 TABLET | Refills: 1 | Status: SHIPPED | OUTPATIENT
Start: 2023-06-20

## 2023-06-20 NOTE — TELEPHONE ENCOUNTER
A refill request was received for:  Requested Prescriptions     Pending Prescriptions Disp Refills    SOLIFENACIN SUCCINATE 10 MG Oral Tab [Pharmacy Med Name: SOLIFENACIN 10MG TABLETS] 90 tablet 1     Sig: TAKE 1 TABLET(10 MG) BY MOUTH EVERY NIGHT     Last refill date:  04/03/2023  Qty: 90  Dx: overactive bladder  Last office visit: 03/08/2023   When is follow up due: 2-3 days PRN per last OV note     For hormone prescriptions, date of last blood test for rx being requested:    No future appointments.

## 2023-06-29 RX ORDER — DOXYCYCLINE HYCLATE 50 MG/1
50 CAPSULE ORAL 2 TIMES DAILY
Qty: 180 CAPSULE | Refills: 0 | Status: SHIPPED | OUTPATIENT
Start: 2023-06-29

## 2023-07-06 RX ORDER — DOXYCYCLINE HYCLATE 50 MG/1
50 CAPSULE ORAL 2 TIMES DAILY
Qty: 180 CAPSULE | Refills: 0 | OUTPATIENT
Start: 2023-07-06

## 2023-07-27 ENCOUNTER — PATIENT MESSAGE (OUTPATIENT)
Dept: FAMILY MEDICINE CLINIC | Facility: CLINIC | Age: 48
End: 2023-07-27

## 2023-07-27 NOTE — TELEPHONE ENCOUNTER
From: Fidencio Chandler  To:  VIET Clay  Sent: 7/27/2023 5:03 PM CDT  Subject: Visit    When am I due for a pap smear

## 2023-07-27 NOTE — TELEPHONE ENCOUNTER
Pt inquiring when to get a pap:      Per Dr. Nury Rodríguez note from 10/27/21 recommendation for pap:    Health maintenance:   PAP q 3-5 years if WNL through 66-70 y/o, last Dr. Maya Power lab interpretation for 2/12/20:     \"Your PAP smear showed ASCUS, atypical cells, which is a benign nonspecific finding. Since your HPV status was negative there is no need for further assessment at this time. Just repeat the PAP smear in one year. Take care,     Albina Mckenna MD  2/14/2020\"    Care Gap:  Pap Smear    Overdue since 2/12/2023 (Every 3 Years)         Last annual PE was also 10/27/21; does Kiara LLANOS recommend pap with annual physical?    Please advise.

## 2023-09-06 RX ORDER — FAMOTIDINE 20 MG/1
TABLET, FILM COATED ORAL
Qty: 180 TABLET | Refills: 0 | Status: SHIPPED | OUTPATIENT
Start: 2023-09-06

## 2023-09-06 NOTE — TELEPHONE ENCOUNTER
A refill request was received for:  Requested Prescriptions     Pending Prescriptions Disp Refills    FAMOTIDINE 20 MG Oral Tab [Pharmacy Med Name: FAMOTIDINE 20MG TABLETS] 180 tablet 0     Sig: TAKE 1 TABLET BY MOUTH TWICE DAILY EVERY DAY     Last refill date:  5/30/23  Qty: 90  Dx:   Last office visit: 3/8/23   When is follow up due: PRN     For hormone prescriptions, date of last blood test for rx being requested:    No future appointments. Pt requesting rx refill. Topical Sulfur Applications Pregnancy And Lactation Text: This medication is Pregnancy Category C and has an unknown safety profile during pregnancy. It is unknown if this topical medication is excreted in breast milk.

## 2023-11-30 RX ORDER — FAMOTIDINE 20 MG/1
TABLET, FILM COATED ORAL
Qty: 180 TABLET | Refills: 0 | OUTPATIENT
Start: 2023-11-30

## 2023-12-28 DIAGNOSIS — N32.81 OVERACTIVE BLADDER: ICD-10-CM

## 2023-12-28 RX ORDER — SOLIFENACIN SUCCINATE 10 MG/1
10 TABLET, FILM COATED ORAL NIGHTLY
Qty: 90 TABLET | Refills: 1 | OUTPATIENT
Start: 2023-12-28

## 2023-12-28 NOTE — TELEPHONE ENCOUNTER
A refill request was received for:  Requested Prescriptions     Pending Prescriptions Disp Refills    SOLIFENACIN SUCCINATE 10 MG Oral Tab [Pharmacy Med Name: SOLIFENACIN 10MG TABLETS] 90 tablet 1     Sig: TAKE 1 TABLET(10 MG) BY MOUTH EVERY NIGHT     Last refill date:  6/20/23   Qty: 90 and 1   Dx: overactive bladder  Last office visit: 3/8/23   When is follow up due: now

## 2024-01-04 ENCOUNTER — PATIENT OUTREACH (OUTPATIENT)
Dept: CASE MANAGEMENT | Age: 49
End: 2024-01-04

## 2024-01-04 NOTE — PROCEDURES
The office order for PCP removal request is Approved and finalized on January 4, 2024.    Thanks,  [facility] Team

## 2024-02-05 DIAGNOSIS — K21.9 GASTROESOPHAGEAL REFLUX DISEASE, UNSPECIFIED WHETHER ESOPHAGITIS PRESENT: ICD-10-CM

## 2024-02-05 RX ORDER — OMEPRAZOLE 20 MG/1
20 CAPSULE, DELAYED RELEASE ORAL
Qty: 90 CAPSULE | Refills: 1 | OUTPATIENT
Start: 2024-02-05

## 2025-02-07 ENCOUNTER — APPOINTMENT (OUTPATIENT)
Dept: GENERAL RADIOLOGY | Age: 50
End: 2025-02-07
Attending: EMERGENCY MEDICINE

## 2025-02-07 ENCOUNTER — HOSPITAL ENCOUNTER (OUTPATIENT)
Age: 50
Setting detail: OBSERVATION
Discharge: HOME | End: 2025-02-09
Attending: EMERGENCY MEDICINE | Admitting: INTERNAL MEDICINE

## 2025-02-07 DIAGNOSIS — R11.10 VOMITING AND DIARRHEA: Primary | ICD-10-CM

## 2025-02-07 DIAGNOSIS — R19.7 VOMITING AND DIARRHEA: Primary | ICD-10-CM

## 2025-02-07 PROBLEM — K52.9 GASTROENTERITIS: Status: ACTIVE | Noted: 2025-02-07

## 2025-02-07 LAB
ALBUMIN SERPL-MCNC: 3.9 G/DL (ref 3.4–5)
ALBUMIN/GLOB SERPL: 1.2 {RATIO} (ref 1–2.4)
ALP SERPL-CCNC: 96 UNITS/L (ref 45–117)
ALT SERPL-CCNC: 28 UNITS/L
ANION GAP SERPL CALC-SCNC: 11 MMOL/L (ref 7–19)
APPEARANCE UR: CLEAR
AST SERPL-CCNC: 21 UNITS/L
ATRIAL RATE (BPM): 119
BASOPHILS # BLD: 0 K/MCL (ref 0–0.3)
BASOPHILS NFR BLD: 0 %
BILIRUB SERPL-MCNC: 0.9 MG/DL (ref 0.2–1)
BILIRUB UR QL STRIP: NEGATIVE
BUN SERPL-MCNC: 19 MG/DL (ref 6–20)
BUN/CREAT SERPL: 25 (ref 7–25)
CALCIUM SERPL-MCNC: 8.6 MG/DL (ref 8.4–10.2)
CHLORIDE SERPL-SCNC: 110 MMOL/L (ref 97–110)
CO2 SERPL-SCNC: 21 MMOL/L (ref 21–32)
COLOR UR: ABNORMAL
CREAT SERPL-MCNC: 0.75 MG/DL (ref 0.51–0.95)
DEPRECATED RDW RBC: 44.7 FL (ref 39–50)
EGFRCR SERPLBLD CKD-EPI 2021: >90 ML/MIN/{1.73_M2}
EOSINOPHIL # BLD: 0 K/MCL (ref 0–0.5)
EOSINOPHIL NFR BLD: 0 %
ERYTHROCYTE [DISTWIDTH] IN BLOOD: 14.1 % (ref 11–15)
FASTING DURATION TIME PATIENT: ABNORMAL H
FLUAV RNA RESP QL NAA+PROBE: NOT DETECTED
FLUBV RNA RESP QL NAA+PROBE: NOT DETECTED
GLOBULIN SER-MCNC: 3.3 G/DL (ref 2–4)
GLUCOSE SERPL-MCNC: 133 MG/DL (ref 70–99)
GLUCOSE UR STRIP-MCNC: NEGATIVE MG/DL
HCT VFR BLD CALC: 40.5 % (ref 36–46.5)
HGB BLD-MCNC: 13.9 G/DL (ref 12–15.5)
HGB UR QL STRIP: NEGATIVE
IMM GRANULOCYTES # BLD AUTO: 0 K/MCL (ref 0–0.2)
IMM GRANULOCYTES # BLD: 0 %
KETONES UR STRIP-MCNC: 40 MG/DL
LEUKOCYTE ESTERASE UR QL STRIP: NEGATIVE
LYMPHOCYTES # BLD: 0.2 K/MCL (ref 1–4.8)
LYMPHOCYTES NFR BLD: 3 %
MCH RBC QN AUTO: 30.3 PG (ref 26–34)
MCHC RBC AUTO-ENTMCNC: 34.3 G/DL (ref 32–36.5)
MCV RBC AUTO: 88.2 FL (ref 78–100)
MONOCYTES # BLD: 0.4 K/MCL (ref 0.3–0.9)
MONOCYTES NFR BLD: 6 %
NEUTROPHILS # BLD: 6.5 K/MCL (ref 1.8–7.7)
NEUTROPHILS NFR BLD: 91 %
NITRITE UR QL STRIP: NEGATIVE
NRBC BLD MANUAL-RTO: 0 /100 WBC
P AXIS (DEGREES): 35
PH UR STRIP: 7 [PH] (ref 5–7)
PLATELET # BLD AUTO: 151 K/MCL (ref 140–450)
POTASSIUM SERPL-SCNC: 3.8 MMOL/L (ref 3.4–5.1)
PR-INTERVAL (MSEC): 160
PROT SERPL-MCNC: 7.2 G/DL (ref 6.4–8.2)
PROT UR STRIP-MCNC: ABNORMAL MG/DL
QRS-INTERVAL (MSEC): 76
QT-INTERVAL (MSEC): 324
QTC: 456
R AXIS (DEGREES): 57
RAINBOW EXTRA TUBES HOLD SPECIMEN: NORMAL
RBC # BLD: 4.59 MIL/MCL (ref 4–5.2)
REPORT TEXT: NORMAL
RSV AG NPH QL IA.RAPID: NOT DETECTED
SARS-COV-2 RNA RESP QL NAA+PROBE: NOT DETECTED
SERVICE CMNT-IMP: NORMAL
SERVICE CMNT-IMP: NORMAL
SODIUM SERPL-SCNC: 138 MMOL/L (ref 135–145)
SP GR UR STRIP: 1.02 (ref 1–1.03)
T AXIS (DEGREES): 64
UROBILINOGEN UR STRIP-MCNC: 0.2 MG/DL
VENTRICULAR RATE EKG/MIN (BPM): 119
WBC # BLD: 7.3 K/MCL (ref 4.2–11)

## 2025-02-07 PROCEDURE — 96361 HYDRATE IV INFUSION ADD-ON: CPT

## 2025-02-07 PROCEDURE — 99223 1ST HOSP IP/OBS HIGH 75: CPT

## 2025-02-07 PROCEDURE — 87040 BLOOD CULTURE FOR BACTERIA: CPT

## 2025-02-07 PROCEDURE — 0241U COVID/FLU/RSV PANEL: CPT | Performed by: EMERGENCY MEDICINE

## 2025-02-07 PROCEDURE — G0378 HOSPITAL OBSERVATION PER HR: HCPCS

## 2025-02-07 PROCEDURE — 10002800 HB RX 250 W HCPCS

## 2025-02-07 PROCEDURE — 93005 ELECTROCARDIOGRAM TRACING: CPT

## 2025-02-07 PROCEDURE — 85025 COMPLETE CBC W/AUTO DIFF WBC: CPT

## 2025-02-07 PROCEDURE — 10002803 HB RX 637

## 2025-02-07 PROCEDURE — 36415 COLL VENOUS BLD VENIPUNCTURE: CPT

## 2025-02-07 PROCEDURE — 10004651 HB RX, NO CHARGE ITEM

## 2025-02-07 PROCEDURE — 99285 EMERGENCY DEPT VISIT HI MDM: CPT

## 2025-02-07 PROCEDURE — 96374 THER/PROPH/DIAG INJ IV PUSH: CPT

## 2025-02-07 PROCEDURE — 81003 URINALYSIS AUTO W/O SCOPE: CPT

## 2025-02-07 PROCEDURE — 80048 BASIC METABOLIC PNL TOTAL CA: CPT

## 2025-02-07 PROCEDURE — 71045 X-RAY EXAM CHEST 1 VIEW: CPT

## 2025-02-07 PROCEDURE — 83036 HEMOGLOBIN GLYCOSYLATED A1C: CPT

## 2025-02-07 PROCEDURE — 10002807 HB RX 258

## 2025-02-07 PROCEDURE — 83735 ASSAY OF MAGNESIUM: CPT

## 2025-02-07 PROCEDURE — 80053 COMPREHEN METABOLIC PANEL: CPT

## 2025-02-07 RX ORDER — METRONIDAZOLE 7.5 MG/G
1 GEL TOPICAL 2 TIMES DAILY
COMMUNITY
Start: 2024-11-01 | End: 2025-11-01

## 2025-02-07 RX ORDER — SOLIFENACIN SUCCINATE 10 MG/1
10 TABLET, FILM COATED ORAL DAILY
COMMUNITY
Start: 2024-12-05

## 2025-02-07 RX ORDER — ONDANSETRON 2 MG/ML
4 INJECTION INTRAMUSCULAR; INTRAVENOUS EVERY 8 HOURS PRN
Status: DISCONTINUED | OUTPATIENT
Start: 2025-02-07 | End: 2025-02-09 | Stop reason: HOSPADM

## 2025-02-07 RX ORDER — GABAPENTIN 300 MG/1
300 CAPSULE ORAL 3 TIMES DAILY
COMMUNITY
Start: 2024-10-24 | End: 2025-05-14

## 2025-02-07 RX ORDER — ACETAMINOPHEN 325 MG/1
650 TABLET ORAL ONCE
Status: COMPLETED | OUTPATIENT
Start: 2025-02-07 | End: 2025-02-07

## 2025-02-07 RX ORDER — ZOLPIDEM TARTRATE 5 MG/1
5 TABLET ORAL NIGHTLY
Status: DISCONTINUED | OUTPATIENT
Start: 2025-02-07 | End: 2025-02-09 | Stop reason: HOSPADM

## 2025-02-07 RX ORDER — 0.9 % SODIUM CHLORIDE 0.9 %
2 VIAL (ML) INJECTION EVERY 12 HOURS SCHEDULED
Status: DISCONTINUED | OUTPATIENT
Start: 2025-02-07 | End: 2025-02-09 | Stop reason: HOSPADM

## 2025-02-07 RX ORDER — POLYETHYLENE GLYCOL 3350 17 G/17G
17 POWDER, FOR SOLUTION ORAL DAILY
COMMUNITY

## 2025-02-07 RX ORDER — 0.9 % SODIUM CHLORIDE 0.9 %
10 VIAL (ML) INJECTION PRN
Status: DISCONTINUED | OUTPATIENT
Start: 2025-02-07 | End: 2025-02-09 | Stop reason: HOSPADM

## 2025-02-07 RX ORDER — ACETAMINOPHEN 325 MG/1
650 TABLET ORAL EVERY 4 HOURS PRN
Status: DISCONTINUED | OUTPATIENT
Start: 2025-02-07 | End: 2025-02-09 | Stop reason: HOSPADM

## 2025-02-07 RX ORDER — FERROUS SULFATE 325(65) MG
325 TABLET, DELAYED RELEASE (ENTERIC COATED) ORAL
COMMUNITY

## 2025-02-07 RX ORDER — DALFAMPRIDINE 10 MG/1
10 TABLET, FILM COATED, EXTENDED RELEASE ORAL EVERY 12 HOURS SCHEDULED
COMMUNITY
Start: 2025-01-21

## 2025-02-07 RX ORDER — ALBUTEROL SULFATE 90 UG/1
1 INHALANT RESPIRATORY (INHALATION) EVERY 6 HOURS PRN
COMMUNITY

## 2025-02-07 RX ORDER — CLONAZEPAM 0.5 MG/1
0.5 TABLET ORAL DAILY
COMMUNITY
Start: 2024-08-19

## 2025-02-07 RX ORDER — TOPIRAMATE 100 MG/1
TABLET, FILM COATED ORAL 2 TIMES DAILY
COMMUNITY

## 2025-02-07 RX ORDER — ONDANSETRON 2 MG/ML
4 INJECTION INTRAMUSCULAR; INTRAVENOUS ONCE
Status: COMPLETED | OUTPATIENT
Start: 2025-02-07 | End: 2025-02-07

## 2025-02-07 RX ORDER — ZOLPIDEM TARTRATE 5 MG/1
5 TABLET ORAL DAILY
COMMUNITY
Start: 2024-11-18

## 2025-02-07 RX ORDER — CELECOXIB 200 MG/1
200 CAPSULE ORAL 2 TIMES DAILY
COMMUNITY
Start: 2024-11-01 | End: 2025-05-14

## 2025-02-07 RX ORDER — PAROXETINE 20 MG/1
20 TABLET, FILM COATED ORAL EVERY MORNING
COMMUNITY
Start: 2025-01-09

## 2025-02-07 RX ORDER — MULTIVITAMIN WITH IRON
250 TABLET ORAL AT BEDTIME
COMMUNITY

## 2025-02-07 RX ORDER — ERGOCALCIFEROL 1.25 MG/1
1.25 CAPSULE, LIQUID FILLED ORAL
COMMUNITY
Start: 2025-01-04

## 2025-02-07 RX ORDER — METHYLPHENIDATE HYDROCHLORIDE 20 MG/1
TABLET ORAL 2 TIMES DAILY
COMMUNITY
Start: 2025-01-02

## 2025-02-07 RX ADMIN — SODIUM CHLORIDE, POTASSIUM CHLORIDE, SODIUM LACTATE AND CALCIUM CHLORIDE 1000 ML: 600; 310; 30; 20 INJECTION, SOLUTION INTRAVENOUS at 16:53

## 2025-02-07 RX ADMIN — ZOLPIDEM TARTRATE 5 MG: 5 TABLET, COATED ORAL at 22:45

## 2025-02-07 RX ADMIN — ACETAMINOPHEN 650 MG: 325 TABLET ORAL at 11:22

## 2025-02-07 RX ADMIN — SODIUM CHLORIDE, PRESERVATIVE FREE 2 ML: 5 INJECTION INTRAVENOUS at 21:50

## 2025-02-07 RX ADMIN — ONDANSETRON 4 MG: 2 INJECTION INTRAMUSCULAR; INTRAVENOUS at 11:22

## 2025-02-07 RX ADMIN — ACETAMINOPHEN 650 MG: 325 TABLET ORAL at 16:56

## 2025-02-07 RX ADMIN — SODIUM CHLORIDE, POTASSIUM CHLORIDE, SODIUM LACTATE AND CALCIUM CHLORIDE 1000 ML: 600; 310; 30; 20 INJECTION, SOLUTION INTRAVENOUS at 11:21

## 2025-02-07 SDOH — ECONOMIC STABILITY: INCOME INSECURITY: IN THE PAST 12 MONTHS, HAS THE ELECTRIC, GAS, OIL, OR WATER COMPANY THREATENED TO SHUT OFF SERVICE IN YOUR HOME?: NO

## 2025-02-07 SDOH — ECONOMIC STABILITY: HOUSING INSECURITY: WHAT IS YOUR LIVING SITUATION TODAY?: I HAVE A STEADY PLACE TO LIVE

## 2025-02-07 SDOH — SOCIAL STABILITY: SOCIAL NETWORK: SUPPORT SYSTEMS: FAMILY MEMBERS

## 2025-02-07 SDOH — HEALTH STABILITY: GENERAL
BECAUSE OF A PHYSICAL, MENTAL, OR EMOTIONAL CONDITION, DO YOU HAVE SERIOUS DIFFICULTY CONCENTRATING, REMEMBERING OR MAKING DECISIONS?: NO

## 2025-02-07 SDOH — ECONOMIC STABILITY: FOOD INSECURITY: WITHIN THE PAST 12 MONTHS, THE FOOD YOU BOUGHT JUST DIDN'T LAST AND YOU DIDN'T HAVE MONEY TO GET MORE.: NEVER TRUE

## 2025-02-07 SDOH — ECONOMIC STABILITY: HOUSING INSECURITY: DO YOU HAVE PROBLEMS WITH ANY OF THE FOLLOWING?: NONE OF THE ABOVE

## 2025-02-07 SDOH — HEALTH STABILITY: PHYSICAL HEALTH: ON AVERAGE, HOW MANY MINUTES DO YOU ENGAGE IN EXERCISE AT THIS LEVEL?: 0 MIN

## 2025-02-07 SDOH — SOCIAL STABILITY: SOCIAL INSECURITY: HOW OFTEN DOES ANYONE, INCLUDING FAMILY AND FRIENDS, PHYSICALLY HURT YOU?: NEVER

## 2025-02-07 SDOH — ECONOMIC STABILITY: TRANSPORTATION INSECURITY
IN THE PAST 12 MONTHS, HAS LACK OF RELIABLE TRANSPORTATION KEPT YOU FROM MEDICAL APPOINTMENTS, MEETINGS, WORK OR FROM GETTING THINGS NEEDED FOR DAILY LIVING?: NO

## 2025-02-07 SDOH — ECONOMIC STABILITY: GENERAL

## 2025-02-07 SDOH — SOCIAL STABILITY: SOCIAL NETWORK
HOW OFTEN DO YOU SEE OR TALK TO PEOPLE THAT YOU CARE ABOUT AND FEEL CLOSE TO? (FOR EXAMPLE: TALKING TO FRIENDS ON THE PHONE, VISITING FRIENDS OR FAMILY, GOING TO CHURCH OR CLUB MEETINGS): 5 OR MORE TIMES A WEEK

## 2025-02-07 SDOH — SOCIAL STABILITY: SOCIAL INSECURITY: HOW OFTEN DOES ANYONE, INCLUDING FAMILY AND FRIENDS, INSULT OR TALK DOWN TO YOU?: NEVER

## 2025-02-07 SDOH — ECONOMIC STABILITY: GENERAL: WOULD YOU LIKE HELP WITH ANY OF THE FOLLOWING NEEDS?: I DON'T WANT HELP WITH ANY OF THESE

## 2025-02-07 SDOH — HEALTH STABILITY: PHYSICAL HEALTH: DO YOU HAVE SERIOUS DIFFICULTY WALKING OR CLIMBING STAIRS?: YES

## 2025-02-07 SDOH — HEALTH STABILITY: PHYSICAL HEALTH: DO YOU HAVE DIFFICULTY DRESSING OR BATHING?: YES

## 2025-02-07 SDOH — HEALTH STABILITY: GENERAL: BECAUSE OF A PHYSICAL, MENTAL, OR EMOTIONAL CONDITION, DO YOU HAVE DIFFICULTY DOING ERRANDS ALONE?: YES

## 2025-02-07 SDOH — ECONOMIC STABILITY: HOUSING INSECURITY: WHAT IS YOUR LIVING SITUATION TODAY?: CONDO

## 2025-02-07 SDOH — SOCIAL STABILITY: SOCIAL INSECURITY: HOW OFTEN DOES ANYONE, INCLUDING FAMILY AND FRIENDS, SCREAM OR CURSE AT YOU?: NEVER

## 2025-02-07 SDOH — SOCIAL STABILITY: SOCIAL INSECURITY: HOW OFTEN DOES ANYONE, INCLUDING FAMILY AND FRIENDS, THREATEN YOU WITH HARM?: NEVER

## 2025-02-07 SDOH — HEALTH STABILITY: PHYSICAL HEALTH: ON AVERAGE, HOW MANY DAYS PER WEEK DO YOU ENGAGE IN MODERATE TO STRENUOUS EXERCISE (LIKE A BRISK WALK)?: 0 DAYS

## 2025-02-07 SDOH — ECONOMIC STABILITY: HOUSING INSECURITY: WHAT IS YOUR LIVING SITUATION TODAY?: ALONE

## 2025-02-07 ASSESSMENT — PATIENT HEALTH QUESTIONNAIRE - PHQ9
SUM OF ALL RESPONSES TO PHQ9 QUESTIONS 1 AND 2: 0
CLINICAL INTERPRETATION OF PHQ2 SCORE: NO FURTHER SCREENING NEEDED
SUM OF ALL RESPONSES TO PHQ9 QUESTIONS 1 AND 2: 0
2. FEELING DOWN, DEPRESSED OR HOPELESS: NOT AT ALL
1. LITTLE INTEREST OR PLEASURE IN DOING THINGS: NOT AT ALL
IS PATIENT ABLE TO COMPLETE PHQ2 OR PHQ9: YES

## 2025-02-07 ASSESSMENT — LIFESTYLE VARIABLES
AUDIT-C TOTAL SCORE: 0
HOW OFTEN DO YOU HAVE 6 OR MORE DRINKS ON ONE OCCASION: NEVER
HOW OFTEN DO YOU HAVE A DRINK CONTAINING ALCOHOL: NEVER
ALCOHOL_USE_STATUS: NO OR LOW RISK WITH VALIDATED TOOL
HOW MANY STANDARD DRINKS CONTAINING ALCOHOL DO YOU HAVE ON A TYPICAL DAY: 0,1 OR 2

## 2025-02-07 ASSESSMENT — COLUMBIA-SUICIDE SEVERITY RATING SCALE - C-SSRS
IS THE PATIENT ABLE TO COMPLETE C-SSRS: YES
6. HAVE YOU EVER DONE ANYTHING, STARTED TO DO ANYTHING, OR PREPARED TO DO ANYTHING TO END YOUR LIFE?: NO
2. HAVE YOU ACTUALLY HAD ANY THOUGHTS OF KILLING YOURSELF?: NO
1. WITHIN THE PAST MONTH, HAVE YOU WISHED YOU WERE DEAD OR WISHED YOU COULD GO TO SLEEP AND NOT WAKE UP?: NO

## 2025-02-07 ASSESSMENT — ACTIVITIES OF DAILY LIVING (ADL)
BATHING: NEEDS ASSISTANCE
ADL_SHORT_OF_BREATH: NO
FEEDING: INDEPENDENT
TOILETING: NEEDS ASSISTANCE
RECENT_DECLINE_ADL: NO
ADL_SCORE: 8
ADL_BEFORE_ADMISSION: NEEDS/REQUIRES ASSISTANCE
DRESSING: NEEDS ASSISTANCE

## 2025-02-07 ASSESSMENT — PAIN SCALES - PAIN ASSESSMENT IN ADVANCED DEMENTIA (PAINAD)
BODYLANGUAGE: RELAXED
BREATHING: NORMAL
FACIALEXPRESSION: SMILING OR INEXPRESSIVE

## 2025-02-07 ASSESSMENT — PAIN SCALES - GENERAL
PAINLEVEL_OUTOF10: 0
PAINLEVEL_OUTOF10: 2
PAINLEVEL_OUTOF10: 0
PAINLEVEL_OUTOF10: 2
PAINLEVEL_OUTOF10: 2

## 2025-02-07 ASSESSMENT — PAIN SCALES - WONG BAKER
WONGBAKER_NUMERICALRESPONSE: 2
WONGBAKER_NUMERICALRESPONSE: 0

## 2025-02-08 LAB
ANION GAP SERPL CALC-SCNC: 8 MMOL/L (ref 7–19)
BUN SERPL-MCNC: 14 MG/DL (ref 6–20)
BUN/CREAT SERPL: 19 (ref 7–25)
CALCIUM SERPL-MCNC: 8.2 MG/DL (ref 8.4–10.2)
CHLORIDE SERPL-SCNC: 114 MMOL/L (ref 97–110)
CO2 SERPL-SCNC: 21 MMOL/L (ref 21–32)
CREAT SERPL-MCNC: 0.74 MG/DL (ref 0.51–0.95)
EGFRCR SERPLBLD CKD-EPI 2021: >90 ML/MIN/{1.73_M2}
FASTING DURATION TIME PATIENT: ABNORMAL H
GLUCOSE SERPL-MCNC: 117 MG/DL (ref 70–99)
HBA1C MFR BLD: 4.8 % (ref 4.5–5.6)
MAGNESIUM SERPL-MCNC: 2 MG/DL (ref 1.7–2.4)
POTASSIUM SERPL-SCNC: 3.7 MMOL/L (ref 3.4–5.1)
SODIUM SERPL-SCNC: 139 MMOL/L (ref 135–145)

## 2025-02-08 PROCEDURE — 10002803 HB RX 637

## 2025-02-08 PROCEDURE — 10004651 HB RX, NO CHARGE ITEM

## 2025-02-08 PROCEDURE — 99232 SBSQ HOSP IP/OBS MODERATE 35: CPT

## 2025-02-08 PROCEDURE — G0378 HOSPITAL OBSERVATION PER HR: HCPCS

## 2025-02-08 RX ORDER — HYDROCODONE BITARTRATE AND ACETAMINOPHEN 5; 325 MG/1; MG/1
1 TABLET ORAL ONCE
Status: COMPLETED | OUTPATIENT
Start: 2025-02-08 | End: 2025-02-08

## 2025-02-08 RX ORDER — CELECOXIB 200 MG/1
200 CAPSULE ORAL 2 TIMES DAILY
Status: DISCONTINUED | OUTPATIENT
Start: 2025-02-08 | End: 2025-02-09 | Stop reason: HOSPADM

## 2025-02-08 RX ORDER — PAROXETINE 20 MG/1
20 TABLET, FILM COATED ORAL EVERY MORNING
Status: DISCONTINUED | OUTPATIENT
Start: 2025-02-08 | End: 2025-02-09 | Stop reason: HOSPADM

## 2025-02-08 RX ORDER — GABAPENTIN 300 MG/1
300 CAPSULE ORAL 3 TIMES DAILY
Status: DISCONTINUED | OUTPATIENT
Start: 2025-02-08 | End: 2025-02-09 | Stop reason: HOSPADM

## 2025-02-08 RX ADMIN — SODIUM CHLORIDE, PRESERVATIVE FREE 2 ML: 5 INJECTION INTRAVENOUS at 21:29

## 2025-02-08 RX ADMIN — CELECOXIB 200 MG: 100 CAPSULE ORAL at 21:26

## 2025-02-08 RX ADMIN — ZOLPIDEM TARTRATE 5 MG: 5 TABLET, COATED ORAL at 21:26

## 2025-02-08 RX ADMIN — GABAPENTIN 300 MG: 300 CAPSULE ORAL at 12:13

## 2025-02-08 RX ADMIN — SODIUM CHLORIDE, PRESERVATIVE FREE 2 ML: 5 INJECTION INTRAVENOUS at 09:09

## 2025-02-08 RX ADMIN — HYDROCODONE BITARTRATE AND ACETAMINOPHEN 1 TABLET: 5; 325 TABLET ORAL at 12:12

## 2025-02-08 RX ADMIN — PAROXETINE 20 MG: 20 TABLET, FILM COATED ORAL at 13:41

## 2025-02-08 RX ADMIN — ACETAMINOPHEN 650 MG: 325 TABLET ORAL at 09:13

## 2025-02-08 RX ADMIN — GABAPENTIN 300 MG: 300 CAPSULE ORAL at 21:26

## 2025-02-08 RX ADMIN — CELECOXIB 200 MG: 100 CAPSULE ORAL at 13:41

## 2025-02-08 ASSESSMENT — PAIN SCALES - GENERAL
PAINLEVEL_OUTOF10: 9
PAINLEVEL_OUTOF10: 9
PAINLEVEL_OUTOF10: 8
PAINLEVEL_OUTOF10: 9

## 2025-02-09 VITALS
RESPIRATION RATE: 18 BRPM | HEART RATE: 76 BPM | TEMPERATURE: 97.5 F | SYSTOLIC BLOOD PRESSURE: 114 MMHG | OXYGEN SATURATION: 98 % | WEIGHT: 161.6 LBS | HEIGHT: 69 IN | BODY MASS INDEX: 23.93 KG/M2 | DIASTOLIC BLOOD PRESSURE: 72 MMHG

## 2025-02-09 LAB
BACTERIA BLD CULT: NORMAL
BACTERIA BLD CULT: NORMAL

## 2025-02-09 PROCEDURE — G0378 HOSPITAL OBSERVATION PER HR: HCPCS

## 2025-02-09 PROCEDURE — 10004651 HB RX, NO CHARGE ITEM

## 2025-02-09 PROCEDURE — 99238 HOSP IP/OBS DSCHRG MGMT 30/<: CPT

## 2025-02-09 PROCEDURE — 10002803 HB RX 637

## 2025-02-09 RX ADMIN — SODIUM CHLORIDE, PRESERVATIVE FREE 2 ML: 5 INJECTION INTRAVENOUS at 09:56

## 2025-02-09 RX ADMIN — PAROXETINE 20 MG: 20 TABLET, FILM COATED ORAL at 07:49

## 2025-02-09 RX ADMIN — GABAPENTIN 300 MG: 300 CAPSULE ORAL at 09:56

## 2025-02-09 RX ADMIN — CELECOXIB 200 MG: 100 CAPSULE ORAL at 09:56

## 2025-02-09 ASSESSMENT — PAIN SCALES - GENERAL
PAINLEVEL_OUTOF10: 9
PAINLEVEL_OUTOF10: 9

## 2025-02-12 LAB
BACTERIA BLD CULT: NORMAL
BACTERIA BLD CULT: NORMAL

## 2025-03-11 ENCOUNTER — TELEPHONE (OUTPATIENT)
Dept: CARE COORDINATION | Age: 50
End: 2025-03-11

## 2025-03-27 NOTE — TELEPHONE ENCOUNTER
Informed pt that medications were denied by her insurance . Pt states she will try to find something OTC and go from there. See Scanned Documents.    PT TOOK ALL COPIES PER ABDULAZIZ Bentley, CMA

## 2025-05-14 NOTE — TELEPHONE ENCOUNTER
From: Ector Valderrama  To: Fiona Carrion MD  Sent: 5/29/2019 2:07 PM CDT  Subject: Prescription Question    I need 30 day clonazepam not 15 and  was going to send in ibuprofen A&OX3, vss Pt A&Ox2-3. VSS. Pt asymptomatic; denies CP/SOB.

## (undated) NOTE — LETTER
19          Jaylin Ginger  :  1975  ID: [id number]      To Whom It May Concern: This patient was seen in our office on May 28, 2019. She was diagnosis with Multiple Sclerosis in  with multiple lesions on the spinal cord.   Through

## (undated) NOTE — LETTER
19          Magdalena Maddox  :  1975      To Whom It May Concern: This patient is under my care for Multiple Sclerosis. If this office may be of further assistance, please do not hesitate to contact us.       Sincerely,        Hai Alberts

## (undated) NOTE — LETTER
19          Nora Oneal  :  1975      To Whom It May Concern: This patient was seen in our office on 19 . Due to neurological reasons, this patient will be unable to participate in jury duty.      If this office may be of further